# Patient Record
Sex: FEMALE | Race: BLACK OR AFRICAN AMERICAN | NOT HISPANIC OR LATINO | Employment: UNEMPLOYED | URBAN - METROPOLITAN AREA
[De-identification: names, ages, dates, MRNs, and addresses within clinical notes are randomized per-mention and may not be internally consistent; named-entity substitution may affect disease eponyms.]

---

## 2018-07-11 ENCOUNTER — TELEPHONE (OUTPATIENT)
Dept: TRANSPLANT | Facility: CLINIC | Age: 63
End: 2018-07-11

## 2018-07-11 NOTE — TELEPHONE ENCOUNTER
----- Message from Yunior Sullivan sent at 7/11/2018  4:18 PM CDT -----  Have the patient's demographic sheet which includes: patient's name, mailing address, phone number and insurance information. We will check to see if patient medical records are in Care Everywhere:[facility]    By: Yunior Sullivan

## 2018-07-12 ENCOUNTER — DOCUMENTATION ONLY (OUTPATIENT)
Dept: TRANSPLANT | Facility: CLINIC | Age: 63
End: 2018-07-12

## 2018-07-12 ENCOUNTER — TELEPHONE (OUTPATIENT)
Dept: TRANSPLANT | Facility: CLINIC | Age: 63
End: 2018-07-12

## 2018-07-12 NOTE — NURSING
Pt called re the referral we received. LVM to call back. We will need her to obtain all imaging since April 2018 to bring to clinic.

## 2018-07-12 NOTE — TELEPHONE ENCOUNTER
Initial referral received from Dr Lebron Dunham.     Patient with HCV/HCC  MELD 11  Referred for liver transplant for EVALUATION     Referral completed and forwarded to [facility].      Insurance: Medicaid  [employer] [place] [employer]  susbscriber ID [id number]

## 2018-07-16 ENCOUNTER — TELEPHONE (OUTPATIENT)
Dept: TRANSPLANT | Facility: CLINIC | Age: 63
End: 2018-07-16

## 2018-07-16 NOTE — TELEPHONE ENCOUNTER
----- Message from Yunior Sullivan sent at 7/16/2018 11:58 AM CDT -----  Called pt to [facility] appt, but there was no answer. LVM for her to call back to [facility]

## 2018-08-02 ENCOUNTER — TELEPHONE (OUTPATIENT)
Dept: TRANSPLANT | Facility: CLINIC | Age: 63
End: 2018-08-02

## 2018-08-20 ENCOUNTER — TELEPHONE (OUTPATIENT)
Dept: TRANSPLANT | Facility: CLINIC | Age: 63
End: 2018-08-20

## 2018-08-20 NOTE — TELEPHONE ENCOUNTER
----- Message from Yunior Sullivan sent at 8/20/2018 10:41 AM CDT -----  Called and sp to pt to see if she has her new insur cards, but she told me that she is still waiting on the cards. Told her to give us a call as soon as she gets her cards, so we can get her william'ed.    Pt recs have been scanned into MEDIA.

## 2018-11-06 ENCOUNTER — TELEPHONE (OUTPATIENT)
Dept: GASTROENTEROLOGY | Facility: CLINIC | Age: 63
End: 2018-11-06

## 2018-11-07 ENCOUNTER — TELEPHONE (OUTPATIENT)
Dept: GASTROENTEROLOGY | Facility: CLINIC | Age: 63
End: 2018-11-07

## 2018-11-07 NOTE — TELEPHONE ENCOUNTER
----- Message from Tina Prescott sent at 11/7/2018  8:07 AM CST -----  Contact: self   Pt unable to make appointment because her transporation can only bring her on thursday's.she is a previous pt at [facility].requesting to know if an appointment can be any thursday if possible. please call back at [phone].        Thanks,  Tina Prescott

## 2018-12-03 ENCOUNTER — TELEPHONE (OUTPATIENT)
Dept: GASTROENTEROLOGY | Facility: CLINIC | Age: 63
End: 2018-12-03

## 2018-12-03 NOTE — TELEPHONE ENCOUNTER
----- Message from Loyda Salinas sent at 12/3/2018 10:13 AM CST -----  Contact: Patient  Patient called to speak with the nurse; she stated that her insurance is back on. She needs to speak with the nurse about pain medication. She can be contacted at [phone].    Thanks,  Loyda

## 2018-12-05 ENCOUNTER — TELEPHONE (OUTPATIENT)
Dept: GASTROENTEROLOGY | Facility: CLINIC | Age: 63
End: 2018-12-05

## 2018-12-05 NOTE — TELEPHONE ENCOUNTER
----- Message from Mary Luevano sent at 12/5/2018  2:12 PM CST -----  Contact: patient  Calling concerning getting something for pain call in to pharmacy. Please call @ [phone]. Thanks, april    [facility] [address]  [phone]

## 2018-12-05 NOTE — TELEPHONE ENCOUNTER
Spoke with patient she reports that she has been constantly having right upper quadrant pain.  This been present since she has her chemo embolizations.  Of note she does have an appointment with me on Friday.  I explained that we will discuss that appointment on Friday to get updated imaging and try better to manage her pain. She expressed understanding.

## 2018-12-05 NOTE — TELEPHONE ENCOUNTER
Returned patients call and patient stated she having severe abdominal pain. Patient stated she is seen at the [facility] clinic and she is suppose to be getting something for pain. Please advise.

## 2018-12-05 NOTE — TELEPHONE ENCOUNTER
----- Message from Mary Luevano sent at 12/5/2018  2:12 PM CST -----  Contact: patient  Calling concerning getting something for pain call in to pharmacy. Please call @ [phone]. Thanks, april    [facility] [address]  [phone]

## 2018-12-07 ENCOUNTER — OFFICE VISIT (OUTPATIENT)
Dept: GASTROENTEROLOGY | Facility: CLINIC | Age: 63
End: 2018-12-07
Payer: MEDICAID

## 2018-12-07 VITALS
HEART RATE: 80 BPM | DIASTOLIC BLOOD PRESSURE: 82 MMHG | SYSTOLIC BLOOD PRESSURE: 144 MMHG | HEIGHT: 60 IN | WEIGHT: 151.44 LBS | BODY MASS INDEX: 29.73 KG/M2

## 2018-12-07 DIAGNOSIS — B19.20 COMPENSATED HCV CIRRHOSIS: ICD-10-CM

## 2018-12-07 DIAGNOSIS — C85.99 GASTRIC LYMPHOMA: ICD-10-CM

## 2018-12-07 DIAGNOSIS — C22.0 HCC (HEPATOCELLULAR CARCINOMA): Primary | ICD-10-CM

## 2018-12-07 DIAGNOSIS — D49.0 LIVER NEOPLASM: ICD-10-CM

## 2018-12-07 DIAGNOSIS — K59.00 CONSTIPATION, UNSPECIFIED CONSTIPATION TYPE: ICD-10-CM

## 2018-12-07 DIAGNOSIS — K74.69 COMPENSATED HCV CIRRHOSIS: ICD-10-CM

## 2018-12-07 PROCEDURE — 99214 OFFICE O/P EST MOD 30 MIN: CPT | Mod: S$PBB,,, | Performed by: INTERNAL MEDICINE

## 2018-12-07 PROCEDURE — 99999 PR PBB SHADOW E&M-EST. PATIENT-LVL III: CPT | Mod: PBBFAC,,, | Performed by: INTERNAL MEDICINE

## 2018-12-07 PROCEDURE — 99213 OFFICE O/P EST LOW 20 MIN: CPT | Mod: PBBFAC,PO | Performed by: INTERNAL MEDICINE

## 2018-12-07 RX ORDER — GABAPENTIN 300 MG/1
CAPSULE ORAL
COMMUNITY
End: 2018-12-13 | Stop reason: SDUPTHER

## 2018-12-07 RX ORDER — AMLODIPINE BESYLATE 10 MG/1
TABLET ORAL
COMMUNITY

## 2018-12-07 RX ORDER — LACTULOSE 10 G/15ML
20 SOLUTION ORAL
COMMUNITY
Start: 2018-07-17

## 2018-12-07 RX ORDER — TIOTROPIUM BROMIDE 18 UG/1
CAPSULE ORAL; RESPIRATORY (INHALATION)
COMMUNITY

## 2018-12-07 RX ORDER — INSULIN LISPRO 100 [IU]/ML
INJECTION, SUSPENSION SUBCUTANEOUS
COMMUNITY
Start: 2018-03-27

## 2018-12-07 RX ORDER — IBANDRONATE SODIUM 150 MG/1
TABLET, FILM COATED ORAL
COMMUNITY
Start: 2018-01-11

## 2018-12-07 RX ORDER — MULTIVITAMIN
1 TABLET ORAL
COMMUNITY

## 2018-12-07 RX ORDER — FLUTICASONE PROPIONATE AND SALMETEROL 250; 50 UG/1; UG/1
POWDER RESPIRATORY (INHALATION)
COMMUNITY
Start: 2016-08-11 | End: 2019-01-21

## 2018-12-07 NOTE — PROGRESS NOTES
Subjective:     Chani Aviles is here for follow up of cirrhosis    HPI  Since Chani Aviles's last visit she has had varices sure it is issues.  Seems she is now back on Medicaid and can be seen at [facility].  There was some concern that she cannot be seen there.  To to prevent lost to followup she was seen here.  She has had repeat imaging.  She is due for repeat MRI which has already been scheduled.  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  The patient is here with her daughter and the daughter is supportive.  The daughter and patient would like to move forward with liver transplant evaluation.    She has been having right-sided abdominal pain.  Can be intermittent.  Sometimes it spreads from the right lower quadrant to right upper quadrant.  She has had issues with constipation.    Ultrasound of the abdomen on 4/4/2018 showed a 4 cm diameter mass in the right lobe of the liver. She subsequently underwent MRI that showed a 4.7 x 4.4 x 3.5 cm hypervascular mass of the anterior superior segment of the right lobe. It was suspicious for neoplasm. She underwent biopsy on 5/1/2018 that was consistent with a hepatocellular carcinoma.    No evidence of liver decompensation: no ascites, confusion or GI bleeding.    S/p TACE 6/2018 and 8/2018    Last EGD was 6/2018 w/o ulcer, lymphoma or HP    HCV treated with Harvoni.    Review of Systems   Constitutional: Positive for activity change and fatigue.   HENT: Negative.    Eyes: Negative.    Respiratory: Negative.    Cardiovascular: Negative.    Gastrointestinal: Positive for abdominal distention, abdominal pain and constipation.   Genitourinary: Negative.    Musculoskeletal: Negative.    Skin: Negative.    Neurological: Negative.    Psychiatric/Behavioral: Negative.        Objective:     Physical Exam   Constitutional: She  is oriented to person, place, and time. She appears well-developed and well-nourished. No distress.   HENT:   Head: Normocephalic and atraumatic.   Mouth/Throat: Oropharynx is clear and moist. No oropharyngeal exudate.   Eyes: Conjunctivae are normal. Pupils are equal, round, and reactive to light. Right eye exhibits no discharge. Left eye exhibits no discharge. No scleral icterus.   Pulmonary/Chest: Effort normal and breath sounds normal. No respiratory distress. She has no wheezes.   Abdominal: Soft. She exhibits distension (tympanic). There is no tenderness.   Musculoskeletal: She exhibits no edema.   Neurological: She is alert and oriented to person, place, and time.   Psychiatric: She has a normal mood and affect. Her behavior is normal.   Vitals reviewed.      CT 11/2018  Interval embolization of the previously identified hepatic mass.    Increased enhancement around the mass may represent posttreatment changes or recurrent tumor. A small nonspecific hypodense region adjacent to the mass as well as an enhancing capsular nodule may represent tumor or posttreatment changes.    A follow-up MRI is recommended.  Computed MELD-Na score unavailable. Necessary lab results were not found in the last year.  Computed MELD score unavailable. Necessary lab results were not found in the last year.    No results found for: WBC, HGB, HCT, PLT  No results found for: BUN, CREATININE, GLU, CALCIUM, NA, K, CL, MG  No results found for: AST, ALT, ALKPHOS, BILITOT, ALBUMIN  No results found for: INR      Assessment/Plan:     1. HCC (hepatocellular carcinoma)    2. Liver neoplasm    3. Compensated HCV cirrhosis    4. Gastric lymphoma    5. Constipation, unspecified constipation type      Chani Aviles is a 63 y.o. female withHepatic Cancer (liver)    HCC (hepatocellular carcinoma)-cancers within Merrick criteria.  She has been treated and responded well.  Uncertain if this still residual cancer.  She is awaiting repeat MRI scan.   Patient is interested to move forward transplant evaluation.  -will send request for transplant evaluation.  As part of evaluation she will need to see Hematology Oncology regarding lymphoma that resolved with H pylori infection.  -     Comprehensive metabolic panel; Future; Expected date: 12/07/2018  -     AFP tumor marker; Future; Expected date: 12/07/2018  -     MRI Abdomen W WO Contrast; Future; Expected date: 12/07/2018    Liver neoplasm  -     MRI Abdomen W WO Contrast; Future; Expected date: 12/07/2018    Compensated HCV cirrhosis-currently compensated  -     Comprehensive metabolic panel; Future; Expected date: 12/07/2018  -     CBC auto differential; Future; Expected date: 12/07/2018  -     AFP tumor marker; Future; Expected date: 12/07/2018  -     Protime-INR; Future; Expected date: 12/07/2018    Gastric lymphoma-diagnosed on biopsy at EGD but without treatment on subsequent biopsies there was no evidence of lymphoma after treatment of H pylori which is now negative.  Patient was seen by Oncology who had followed her at [facility] and did not think this is prohibitive to liver transplantation.  -will request outside records of gastric biopsies so that patient can be seen by our oncologist as part of her transplant evaluation  -numerous records in Care everywhere     Constipation, unspecified constipation type-abdominal symptoms seem consistent with gas and a constipation. No significant ascites on recent imaging or on physical exam.  -advise she take MiraLax 1 cap daily    Patient was seen in the liver transplant department at [facility].    Return to clinic in 2 months at [facility]      MD ROSE Sanchez Patient Status  Functional Status: 70% - Cares for self: unable to carry on normal activity or active work  Physical Capacity: No Limitations

## 2018-12-07 NOTE — Clinical Note
Patient needs to see onc as part of eval. May be easiest is some can print out the previous EGD, imaging and path for them to review. I am trying to get slides for us to review of the HCC and gastric lymphoma.

## 2018-12-10 ENCOUNTER — TELEPHONE (OUTPATIENT)
Dept: TRANSPLANT | Facility: CLINIC | Age: 63
End: 2018-12-10

## 2018-12-10 ENCOUNTER — TELEPHONE (OUTPATIENT)
Dept: GASTROENTEROLOGY | Facility: CLINIC | Age: 63
End: 2018-12-10

## 2018-12-10 DIAGNOSIS — G62.9 NEUROPATHY: Primary | ICD-10-CM

## 2018-12-10 NOTE — TELEPHONE ENCOUNTER
Spoke with patient. She states  was going to send her a prescription for Gabapentin. Will inform , she verbalized understanding, JUAN.

## 2018-12-10 NOTE — TELEPHONE ENCOUNTER
----- Message from Neyda Hargrove sent at 12/10/2018  1:53 PM CST -----  Contact: pt  Calling in regards to RX medication and please advise [phone]

## 2018-12-10 NOTE — TELEPHONE ENCOUNTER
Referral received from Dr Lorraine Yeboah  Initial  referral from Lebron Dunham.      Patient with HCC/HCV .  MELD 11  Referred for liver transplant for  EVALUATION.    Referral completed and forwarded to [facility].          Insurance: [employer]

## 2018-12-10 NOTE — TELEPHONE ENCOUNTER
----- Message from Lorraine Yeboah MD sent at 12/7/2018 10:23 AM CST -----  Patient needs to see onc as part of eval. May be easiest is some can print out the previous EGD, imaging and path for them to review. I am trying to get slides for us to review of the HCC and gastric lymphoma.

## 2018-12-13 ENCOUNTER — TELEPHONE (OUTPATIENT)
Dept: TRANSPLANT | Facility: CLINIC | Age: 63
End: 2018-12-13

## 2018-12-13 DIAGNOSIS — C85.99 GASTRIC LYMPHOMA: ICD-10-CM

## 2018-12-13 DIAGNOSIS — B19.20 COMPENSATED HCV CIRRHOSIS: ICD-10-CM

## 2018-12-13 DIAGNOSIS — K74.69 COMPENSATED HCV CIRRHOSIS: ICD-10-CM

## 2018-12-13 DIAGNOSIS — Z76.82 LIVER TRANSPLANT CANDIDATE: Primary | ICD-10-CM

## 2018-12-13 DIAGNOSIS — C76.8 MALIGNANT NEOPLASM OF OTHER SPECIFIED ILL-DEFINED SITES: ICD-10-CM

## 2018-12-13 DIAGNOSIS — C22.0 HCC (HEPATOCELLULAR CARCINOMA): ICD-10-CM

## 2018-12-13 RX ORDER — GABAPENTIN 300 MG/1
300 CAPSULE ORAL 3 TIMES DAILY
Qty: 90 CAPSULE | Refills: 5 | Status: SHIPPED | OUTPATIENT
Start: 2018-12-13 | End: 2019-06-25 | Stop reason: SDUPTHER

## 2018-12-14 ENCOUNTER — TELEPHONE (OUTPATIENT)
Dept: TRANSPLANT | Facility: CLINIC | Age: 63
End: 2018-12-14

## 2018-12-14 NOTE — TELEPHONE ENCOUNTER
Called pt to aware her about her authorization release form will be mailed out to her for her signature so we can receive her EDG and biopsy report before her haematology appointment. Pt didn't answer but did leave an detail message for her as well as phone number to call back if there are any questions are concerns

## 2018-12-17 ENCOUNTER — TELEPHONE (OUTPATIENT)
Dept: TRANSPLANT | Facility: CLINIC | Age: 63
End: 2018-12-17

## 2018-12-17 NOTE — TELEPHONE ENCOUNTER
Call pt to discuss standard eval pt explain she is not int rested in the process she believe her liver can heal if she stick to a more comfortable process the evaluation she believe will stress her more having to deal with setting up transportion lodging and passing that message along to her family members

## 2018-12-20 NOTE — TELEPHONE ENCOUNTER
Informed by MA that patient states she is not interested in liver transplant.  Call placed to patient to further discuss.  Patient states transplant will cause too much stress and financial hardship for her and the family.  States she only wants to continue to follow-up with Dr. Yeboah and tumor treatment as indicated.  Advised Dr. Yeboah will be notified.  Understanding expressed.      [place] encounter resolved.  MINNIE Rivera LPN notified.

## 2018-12-27 ENCOUNTER — TELEPHONE (OUTPATIENT)
Dept: HEMATOLOGY/ONCOLOGY | Facility: CLINIC | Age: 63
End: 2018-12-27

## 2018-12-31 DIAGNOSIS — C22.0 HCC (HEPATOCELLULAR CARCINOMA): Primary | ICD-10-CM

## 2018-12-31 PROCEDURE — 88321 TISSUE SPECIMEN TO PATHOLOGY: ICD-10-PCS | Mod: ,,, | Performed by: PATHOLOGY

## 2018-12-31 PROCEDURE — 88321 CONSLTJ&REPRT SLD PREP ELSWR: CPT | Mod: ,,, | Performed by: PATHOLOGY

## 2019-01-04 ENCOUNTER — TELEPHONE (OUTPATIENT)
Dept: TRANSPLANT | Facility: CLINIC | Age: 64
End: 2019-01-04

## 2019-01-04 NOTE — TELEPHONE ENCOUNTER
Spoke with the patient who has not decided not go through transplant evaluation, about continuation of treatment for her HCC.   Patient is scheduled for post treatment MRI with Dr Kwan on 1/131/19.  Patient report the she missed her oncology appointment but will reschedule the appointment.

## 2019-01-09 ENCOUNTER — TELEPHONE (OUTPATIENT)
Dept: HEMATOLOGY/ONCOLOGY | Facility: CLINIC | Age: 64
End: 2019-01-09

## 2019-01-09 NOTE — TELEPHONE ENCOUNTER
Spoke with Stefani (daughter) re poss clinical trial and offered appt. She will discuss with mother and let me know tomorrow.    ========================================  ----- Message from Luis Manuel Green MD sent at 1/9/2019  3:33 PM CST -----  Latricia,    This patient has a newly diagnosed diffuse large B-cell lymphoma. She was supposed to see Dr. Moore in [place] but missed her appointment.    We have a clinical trial in [place] for which she may be eligible. We should reach out to her to get her a new appointment and see if she is willing to come to [place] for therapy.    Thanks,    Luis Manuel

## 2019-01-21 ENCOUNTER — INITIAL CONSULT (OUTPATIENT)
Dept: HEMATOLOGY/ONCOLOGY | Facility: CLINIC | Age: 64
End: 2019-01-21
Payer: MEDICAID

## 2019-01-21 ENCOUNTER — LAB VISIT (OUTPATIENT)
Dept: LAB | Facility: HOSPITAL | Age: 64
End: 2019-01-21
Attending: INTERNAL MEDICINE
Payer: MEDICAID

## 2019-01-21 ENCOUNTER — TELEPHONE (OUTPATIENT)
Dept: GASTROENTEROLOGY | Facility: CLINIC | Age: 64
End: 2019-01-21

## 2019-01-21 VITALS
DIASTOLIC BLOOD PRESSURE: 84 MMHG | BODY MASS INDEX: 30.12 KG/M2 | HEART RATE: 82 BPM | OXYGEN SATURATION: 96 % | HEIGHT: 60 IN | SYSTOLIC BLOOD PRESSURE: 166 MMHG | WEIGHT: 153.44 LBS | TEMPERATURE: 98 F

## 2019-01-21 DIAGNOSIS — C22.0 HCC (HEPATOCELLULAR CARCINOMA): ICD-10-CM

## 2019-01-21 DIAGNOSIS — C85.99 GASTRIC LYMPHOMA: Primary | ICD-10-CM

## 2019-01-21 DIAGNOSIS — K74.69 COMPENSATED HCV CIRRHOSIS: ICD-10-CM

## 2019-01-21 DIAGNOSIS — C85.99 GASTRIC LYMPHOMA: ICD-10-CM

## 2019-01-21 DIAGNOSIS — Z76.82 LIVER TRANSPLANT CANDIDATE: ICD-10-CM

## 2019-01-21 DIAGNOSIS — B19.20 COMPENSATED HCV CIRRHOSIS: ICD-10-CM

## 2019-01-21 LAB
ALBUMIN SERPL BCP-MCNC: 2.3 G/DL
ALP SERPL-CCNC: 268 U/L
ALT SERPL W/O P-5'-P-CCNC: 36 U/L
ANION GAP SERPL CALC-SCNC: 5 MMOL/L
AST SERPL-CCNC: 61 U/L
BASOPHILS # BLD AUTO: 0.04 K/UL
BASOPHILS NFR BLD: 0.9 %
BILIRUB SERPL-MCNC: 1.2 MG/DL
BUN SERPL-MCNC: 21 MG/DL
CALCIUM SERPL-MCNC: 8.8 MG/DL
CHLORIDE SERPL-SCNC: 107 MMOL/L
CO2 SERPL-SCNC: 26 MMOL/L
CREAT SERPL-MCNC: 1.3 MG/DL
DIFFERENTIAL METHOD: ABNORMAL
EOSINOPHIL # BLD AUTO: 0.1 K/UL
EOSINOPHIL NFR BLD: 1.8 %
ERYTHROCYTE [DISTWIDTH] IN BLOOD BY AUTOMATED COUNT: 13.3 %
EST. GFR  (AFRICAN AMERICAN): 50.1 ML/MIN/1.73 M^2
EST. GFR  (NON AFRICAN AMERICAN): 43.5 ML/MIN/1.73 M^2
GLUCOSE SERPL-MCNC: 112 MG/DL
HBV CORE AB SERPL QL IA: POSITIVE
HBV SURFACE AB SER-ACNC: NEGATIVE M[IU]/ML
HBV SURFACE AG SERPL QL IA: NEGATIVE
HCT VFR BLD AUTO: 40.4 %
HCV AB SERPL QL IA: POSITIVE
HGB BLD-MCNC: 13.6 G/DL
HIV 1+2 AB+HIV1 P24 AG SERPL QL IA: NEGATIVE
IMM GRANULOCYTES # BLD AUTO: 0.01 K/UL
IMM GRANULOCYTES NFR BLD AUTO: 0.2 %
LDH SERPL L TO P-CCNC: 201 U/L
LYMPHOCYTES # BLD AUTO: 1.3 K/UL
LYMPHOCYTES NFR BLD: 28.4 %
MCH RBC QN AUTO: 31.7 PG
MCHC RBC AUTO-ENTMCNC: 33.7 G/DL
MCV RBC AUTO: 94 FL
MONOCYTES # BLD AUTO: 0.5 K/UL
MONOCYTES NFR BLD: 11.9 %
NEUTROPHILS # BLD AUTO: 2.5 K/UL
NEUTROPHILS NFR BLD: 56.8 %
NRBC BLD-RTO: 0 /100 WBC
PLATELET # BLD AUTO: 87 K/UL
PMV BLD AUTO: 13 FL
POTASSIUM SERPL-SCNC: 4.1 MMOL/L
PROT SERPL-MCNC: 6.7 G/DL
RBC # BLD AUTO: 4.29 M/UL
SODIUM SERPL-SCNC: 138 MMOL/L
WBC # BLD AUTO: 4.47 K/UL

## 2019-01-21 PROCEDURE — 87522 HEPATITIS C REVRS TRNSCRPJ: CPT

## 2019-01-21 PROCEDURE — 99214 OFFICE O/P EST MOD 30 MIN: CPT | Mod: PBBFAC | Performed by: INTERNAL MEDICINE

## 2019-01-21 PROCEDURE — 99999 PR PBB SHADOW E&M-EST. PATIENT-LVL IV: CPT | Mod: PBBFAC,,, | Performed by: INTERNAL MEDICINE

## 2019-01-21 PROCEDURE — 86703 HIV-1/HIV-2 1 RESULT ANTBDY: CPT

## 2019-01-21 PROCEDURE — 86706 HEP B SURFACE ANTIBODY: CPT

## 2019-01-21 PROCEDURE — 86704 HEP B CORE ANTIBODY TOTAL: CPT

## 2019-01-21 PROCEDURE — 99205 PR OFFICE/OUTPT VISIT, NEW, LEVL V, 60-74 MIN: ICD-10-PCS | Mod: S$PBB,,, | Performed by: INTERNAL MEDICINE

## 2019-01-21 PROCEDURE — 99205 OFFICE O/P NEW HI 60 MIN: CPT | Mod: S$PBB,,, | Performed by: INTERNAL MEDICINE

## 2019-01-21 PROCEDURE — 86803 HEPATITIS C AB TEST: CPT

## 2019-01-21 PROCEDURE — 87340 HEPATITIS B SURFACE AG IA: CPT

## 2019-01-21 PROCEDURE — 36415 COLL VENOUS BLD VENIPUNCTURE: CPT

## 2019-01-21 PROCEDURE — 80053 COMPREHEN METABOLIC PANEL: CPT

## 2019-01-21 PROCEDURE — 85025 COMPLETE CBC W/AUTO DIFF WBC: CPT

## 2019-01-21 PROCEDURE — 99999 PR PBB SHADOW E&M-EST. PATIENT-LVL IV: ICD-10-PCS | Mod: PBBFAC,,, | Performed by: INTERNAL MEDICINE

## 2019-01-21 PROCEDURE — 83615 LACTATE (LD) (LDH) ENZYME: CPT

## 2019-01-21 RX ORDER — LOSARTAN POTASSIUM 100 MG/1
100 TABLET ORAL DAILY
Refills: 3 | COMMUNITY
Start: 2019-01-10 | End: 2019-03-19

## 2019-01-21 RX ORDER — SPIRONOLACTONE 25 MG/1
25 TABLET ORAL 2 TIMES DAILY
Refills: 3 | COMMUNITY
Start: 2019-01-10

## 2019-01-21 RX ORDER — FUROSEMIDE 20 MG/1
20 TABLET ORAL DAILY
Refills: 3 | COMMUNITY
Start: 2019-01-10

## 2019-01-21 RX ORDER — HYDROCODONE BITARTRATE AND ACETAMINOPHEN 5; 325 MG/1; MG/1
1 TABLET ORAL EVERY 4 HOURS PRN
Refills: 0 | COMMUNITY
Start: 2019-01-06

## 2019-01-21 NOTE — Clinical Note
Patient needs labs today.Please schedule CT abdomen/pelvis and PET/CT in [place] as soon as possible.Referral to GI in [place] placed. Please help schedule as soon as possible.

## 2019-01-21 NOTE — TELEPHONE ENCOUNTER
Called and spoke to a physician in pathology at [facility].  She asked for the phone number and  at [facility].  She will call them and arrange to have slides sent back.

## 2019-01-21 NOTE — TELEPHONE ENCOUNTER
----- Message from Nancy Gates sent at 1/21/2019 12:51 PM CST -----  Contact: Latrell/ [facility] -[phone]  States that she is calling to speak to nurse regarding getting slides back. Please call back at [phone]//thank you acc

## 2019-01-21 NOTE — ASSESSMENT & PLAN NOTE
Ms. Aviles appears to have had a gastric diffuse large B-cell lymphoma that attained remission after therapy with H. Pylori.     Although MALT lymphomas are more classically H. Pylori associated, gastric DLBCL has been described in association with H. Pylori. In a small, phase 2 trial of H. Pylori eradication as the exclusive management of DLBCL, 16 patients were treated with H. Pylori eradication only, and 7 complete remissions were obtained that appeared durable with a median of 68 months of follow-up (Ferreri et al. Blood. 2012;120:8127-6686). Additional work has also been published to suggest that H. Pylori-associated DLBCL has a higher cure rate and lower aggressiveness than more classical DLBCL (Dewayne et al. Blood Cancer J. 2014;4:e220). Thus, it is possible that Ms. Aviles is in CR after her treatment for H. Pylori. She is clinically well at this time, and active disease would likely have a short interval until symptom onset given the high proliferative index (Ki-67 of 90%) of her underlying lymphoma.    Therefore, I suspect she is in a complete remission; however, we need to confirm this. I have recommended the following tests:  - Repeat EGD with random biopsies (if no abnormalities seen)  - PET/CT  - CT abdomen/pelvis with triple phase IV contrast (to help delineate HCC)    If she remains in complete remission more than one and a half years since her diagnosis, then I suspect she will have ongoing remission. Liver transplantation (and subsequent immunosuppression) may elevate the risk of relapse; however, it would remain a treatable, and potentially curable condition. I will finalize my risk assessment for transplant once the above studies are complete.

## 2019-01-21 NOTE — PROGRESS NOTES
HEMATOLOGIC MALIGNANCIES CONSULT NOTE    IDENTIFYING STATEMENT   Chani Henriquez) is a 63 y.o. female with a  of 1955 from [place], referred by Dr. Yeboah for evaluation of high grade B-cell lymphoma.     HISTORY OF PRESENT ILLNESS:      Ms. 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 She is accompanied by her daughter today.     She had EGD in 3/2017 as part of a screening for esophageal varices, given her known cirrhosis. An ulcer was found and biopsied. This returned as consistent with a high-grade B-cell lymphoma. She was also H. Pylori positive and treated for this. She was referred to Dr. Martínez locally who ordered staging scans, but the patient did not present initially, ostensibly due to insurance issues.     She ultimately followed-up in 2017, at which time a repeat EGD was requested. This showed no evidence of ulceration. A PET/CT obtained at that time was also negative for malignancy. Therapy was not recommended.     She followed up with Dr. Martínez again in 2018. She was being managed for hepatocellular carcinoma at that time. Again, it was considered that she did not have any active lymphoma.    She is being evaluated by Dr. Yeboah for a possible liver transplant. They have requested evaluation as part of a liver transplant eval.     Past Medical History:   Diagnosis Date    Compensated HCV cirrhosis 2018    Constipation 2018    Gastric lymphoma 2018    HCC (hepatocellular carcinoma) 2018       History reviewed. No pertinent family history.    Social History     Socioeconomic History    Marital status:      Spouse name: Not on file    Number of children: Not on file    Years of education: Not on file    Highest education level: Not on file   Social Needs    Financial resource strain:  Not on file    Food insecurity - worry: Not on file    Food insecurity - inability: Not on file    Transportation needs - medical: Not on file    Transportation needs - non-medical: Not on file   Occupational History    Not on file   Tobacco Use    Smoking status: Light Tobacco Smoker     Types: Cigarettes    Smokeless tobacco: Never Used   Substance and Sexual Activity    Alcohol use: No     Frequency: Never     Binge frequency: Never    Drug use: No    Sexual activity: No   Other Topics Concern    Not on file   Social History Narrative    Not on file         MEDICATIONS:     Current Outpatient Medications on File Prior to Visit   Medication Sig Dispense Refill    amLODIPine (NORVASC) 10 MG tablet amlodipine 10 mg tablet      furosemide (LASIX) 20 MG tablet Take 20 mg by mouth once daily.  3    gabapentin (NEURONTIN) 300 MG capsule Take 1 capsule (300 mg total) by mouth 3 (three) times daily. 90 capsule 5    HYDROcodone-acetaminophen (NORCO) 5-325 mg per tablet Take 1 tablet by mouth every 4 (four) hours as needed. for pain.  0    ibandronate (BONIVA) 150 mg tablet ibandronate 150 mg tablet      insulin lispro protamin-lispro (HUMALOG MIX 75-25 KWIKPEN) 100 unit/mL (75-25) InPn 25 UNITS SUBCUTANEOUSLY IN AM AND 15 UNITS AT NIGHT      iron bis-gly-FA-O-D15-Gi-succ 150 mg iron(21) -400 mcg (7) Tab Take 1 tablet by mouth.      lactulose (CHRONULAC) 20 gram/30 mL Soln Take 20 g by mouth.      losartan (COZAAR) 100 MG tablet Take 100 mg by mouth once daily.  3    multivitamin (ONE DAILY MULTIVITAMIN) per tablet Take 1 tablet by mouth.      spironolactone (ALDACTONE) 25 MG tablet Take 25 mg by mouth 2 (two) times daily.  3    tiotropium (SPIRIVA WITH HANDIHALER) 18 mcg inhalation capsule Spiriva with HandiHaler 18 mcg and inhalation capsules      tiotropium bromide (SPIRIVA RESPIMAT) 2.5 mcg/actuation Mist Spiriva Respimat 2.5 mcg/actuation solution for inhalation       No current  facility-administered medications on file prior to visit.        ALLERGIES: Review of patient's allergies indicates:  No Known Allergies     ROS:       Review of Systems   Constitutional: Positive for fatigue. Negative for diaphoresis, fever and unexpected weight change.   HENT:   Negative for lump/mass and sore throat.    Eyes: Negative for icterus.   Respiratory: Negative for cough and shortness of breath.    Cardiovascular: Negative for chest pain and palpitations.   Gastrointestinal: Positive for abdominal distention and abdominal pain (to right upper quadrant). Negative for constipation, diarrhea, nausea and vomiting.   Genitourinary: Negative for dysuria and frequency.    Musculoskeletal: Negative for arthralgias, gait problem and myalgias.   Skin: Negative for rash.   Neurological: Negative for dizziness, gait problem and headaches.   Hematological: Negative for adenopathy. Does not bruise/bleed easily.   Psychiatric/Behavioral: The patient is not nervous/anxious.        PHYSICAL EXAM:  Vitals:    01/21/19 0855   BP: (!) 166/84   Pulse: 82   Temp: 97.9 °F (36.6 °C)   SpO2: 96%   Weight: 69.6 kg (153 lb 7 oz)   Height: 5' (1.524 m)   PainSc:   4   PainLoc: Generalized       Physical Exam   Constitutional: She is oriented to person, place, and time. She appears well-developed and well-nourished. No distress.   HENT:   Head: Normocephalic and atraumatic.   Mouth/Throat: Mucous membranes are normal. No oral lesions.   Eyes: Conjunctivae are normal.   Neck: No thyromegaly present.   Cardiovascular: Normal rate, regular rhythm and normal heart sounds.   No murmur heard.  Pulmonary/Chest: Breath sounds normal. She has no wheezes. She has no rales.   Abdominal: Soft. She exhibits distension. She exhibits no mass. There is no splenomegaly or hepatomegaly. There is tenderness (to right upper quadrant with palpation).   Lymphadenopathy:     She has no cervical adenopathy.        Right cervical: No deep cervical  adenopathy present.       Left cervical: No deep cervical adenopathy present.     She has no axillary adenopathy.        Right: No inguinal adenopathy present.        Left: No inguinal adenopathy present.   Neurological: She is alert and oriented to person, place, and time. She has normal strength and normal reflexes. No cranial nerve deficit. Coordination normal.   Skin: No rash noted.       LAB: No results found for this or any previous visit.    PROBLEMS ASSESSED THIS VISIT:    1. Gastric lymphoma    2. HCC (hepatocellular carcinoma)    3. Liver transplant candidate    4. Compensated HCV cirrhosis        IMPRESSION:    1. 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 3/13/2017: EGD as part of cirrhosis evaluation - gastric ulcers seen and biopsied - high-grade B-cell lymphoma with MYC translocation (but no BCL-2 or BCL-6 translocation).   B. Subsequent treatment of H. Pylori   C. 9/2017: Repeat EGD showed no ulceration   D. 10/16/2017: PET/CT shows no evidence of malignant disease   E. 6/4/2018: EGD with biopsies shows no evidence of malignancy    2. Locally advanced hepatocellular carcinoma   A. 4/4/2018: U/S abdomen showed 4 cm diameter mass in right lobe of the liver   B. Subsequent MRI showed 4.7 x 4.4 x 3.5 cm hypervascular mass of the anterior superior segment of the right lobe, suspicious for neoplasm   C. 5/1/2018: Biopsy is consistent with hepatocellular carcinoma   D. 6/12/2018: TACE    3. Hepatitis C s/p treatment with ledipasvir and sofosbuvir  4. Compensated cirrhosis of the liver secondary to #3 above    PLAN:       Gastric lymphoma  Ms. Aviles appears to have had a gastric diffuse large B-cell lymphoma that attained remission after therapy with H. Pylori.     Although MALT lymphomas are more classically H. Pylori associated, gastric DLBCL has been described in association with H. Pylori. In a small, phase 2 trial of H. Pylori eradication as the exclusive management of  DLBCL, 16 patients were treated with H. Pylori eradication only, and 7 complete remissions were obtained that appeared durable with a median of 68 months of follow-up (Ferreri et al. Blood. 2012;120:7393-3878). Additional work has also been published to suggest that H. Pylori-associated DLBCL has a higher cure rate and lower aggressiveness than more classical DLBCL (Dewayne et al. Blood Cancer J. 2014;4:e220). Thus, it is possible that Ms. Aviles is in CR after her treatment for H. Pylori. She is clinically well at this time, and active disease would likely have a short interval until symptom onset given the high proliferative index (Ki-67 of 90%) of her underlying lymphoma.    Therefore, I suspect she is in a complete remission; however, we need to confirm this. I have recommended the following tests:  - Repeat EGD with random biopsies (if no abnormalities seen)  - PET/CT  - CT abdomen/pelvis with triple phase IV contrast (to help delineate HCC)    If she remains in complete remission more than one and a half years since her diagnosis, then I suspect she will have ongoing remission. Liver transplantation (and subsequent immunosuppression) may elevate the risk of relapse; however, it would remain a treatable, and potentially curable condition. I will finalize my risk assessment for transplant once the above studies are complete.     HCC (hepatocellular carcinoma)  Receiving therapy locally. We will be assessing this with upcoming CT. She is under consideration for liver transplant.     Compensated HCV cirrhosis  No evidence of decompensated disease at this time. Continue furosemide and spironolactone.     Follow-up to be determined after above studies.     Luis Manuel Green MD  Hematology and Stem Cell Transplant

## 2019-01-22 LAB
HCV RNA SERPL NAA+PROBE-LOG IU: 4.86 LOG (10) IU/ML
HCV RNA SERPL QL NAA+PROBE: DETECTED IU/ML
HCV RNA SPEC NAA+PROBE-ACNC: ABNORMAL IU/ML

## 2019-01-22 NOTE — ASSESSMENT & PLAN NOTE
Receiving therapy locally. We will be assessing this with upcoming CT. She is under consideration for liver transplant.

## 2019-02-18 ENCOUNTER — HOSPITAL ENCOUNTER (OUTPATIENT)
Facility: HOSPITAL | Age: 64
Discharge: HOME | End: 2019-02-20
Attending: EMERGENCY MEDICINE | Admitting: HOSPITALIST
Payer: MEDICAID

## 2019-02-18 DIAGNOSIS — K74.69 COMPENSATED HCV CIRRHOSIS: ICD-10-CM

## 2019-02-18 DIAGNOSIS — C85.99 GASTRIC LYMPHOMA: ICD-10-CM

## 2019-02-18 DIAGNOSIS — C22.0 HCC (HEPATOCELLULAR CARCINOMA): ICD-10-CM

## 2019-02-18 DIAGNOSIS — K74.60 CIRRHOSIS OF LIVER WITHOUT ASCITES, UNSPECIFIED HEPATIC CIRRHOSIS TYPE: ICD-10-CM

## 2019-02-18 DIAGNOSIS — B19.20 COMPENSATED HCV CIRRHOSIS: ICD-10-CM

## 2019-02-18 DIAGNOSIS — D62 ANEMIA DUE TO BLOOD LOSS, ACUTE: ICD-10-CM

## 2019-02-18 DIAGNOSIS — K92.1 MELENA: Primary | ICD-10-CM

## 2019-02-18 DIAGNOSIS — K92.1 GASTROINTESTINAL HEMORRHAGE WITH MELENA: ICD-10-CM

## 2019-02-18 LAB
ABO + RH BLD: NORMAL
ALBUMIN SERPL BCP-MCNC: 2.3 G/DL
ALP SERPL-CCNC: 312 U/L
ALT SERPL W/O P-5'-P-CCNC: 40 U/L
ANION GAP SERPL CALC-SCNC: 7 MMOL/L
APTT BLDCRRT: 26.9 SEC
AST SERPL-CCNC: 82 U/L
BACTERIA #/AREA URNS HPF: ABNORMAL /HPF
BASOPHILS # BLD AUTO: 0.03 K/UL
BASOPHILS NFR BLD: 0.5 %
BILIRUB SERPL-MCNC: 0.5 MG/DL
BILIRUB UR QL STRIP: NEGATIVE
BLD GP AB SCN CELLS X3 SERPL QL: NORMAL
BUN SERPL-MCNC: 29 MG/DL
CALCIUM SERPL-MCNC: 8.7 MG/DL
CHLORIDE SERPL-SCNC: 108 MMOL/L
CLARITY UR: CLEAR
CO2 SERPL-SCNC: 27 MMOL/L
COLOR UR: YELLOW
CREAT SERPL-MCNC: 1.4 MG/DL
DIFFERENTIAL METHOD: ABNORMAL
EOSINOPHIL # BLD AUTO: 0.1 K/UL
EOSINOPHIL NFR BLD: 1.8 %
ERYTHROCYTE [DISTWIDTH] IN BLOOD BY AUTOMATED COUNT: 14.1 %
EST. GFR  (AFRICAN AMERICAN): 46 ML/MIN/1.73 M^2
EST. GFR  (NON AFRICAN AMERICAN): 40 ML/MIN/1.73 M^2
ETHANOL SERPL-MCNC: <10 MG/DL
GLUCOSE SERPL-MCNC: 123 MG/DL
GLUCOSE UR QL STRIP: NEGATIVE
HCT VFR BLD AUTO: 34.8 %
HGB BLD-MCNC: 11.8 G/DL
HGB UR QL STRIP: ABNORMAL
HYALINE CASTS #/AREA URNS LPF: 0 /LPF
INR PPP: 1
KETONES UR QL STRIP: NEGATIVE
LEUKOCYTE ESTERASE UR QL STRIP: NEGATIVE
LYMPHOCYTES # BLD AUTO: 1.5 K/UL
LYMPHOCYTES NFR BLD: 27 %
MAGNESIUM SERPL-MCNC: 2.2 MG/DL
MCH RBC QN AUTO: 32.2 PG
MCHC RBC AUTO-ENTMCNC: 33.9 G/DL
MCV RBC AUTO: 95 FL
MICROSCOPIC COMMENT: ABNORMAL
MONOCYTES # BLD AUTO: 0.5 K/UL
MONOCYTES NFR BLD: 8.9 %
NEUTROPHILS # BLD AUTO: 3.5 K/UL
NEUTROPHILS NFR BLD: 61.8 %
NITRITE UR QL STRIP: NEGATIVE
PH UR STRIP: 6 [PH] (ref 5–8)
PHOSPHATE SERPL-MCNC: 3.1 MG/DL
PLATELET # BLD AUTO: 98 K/UL
PMV BLD AUTO: 12.6 FL
POCT GLUCOSE: 109 MG/DL (ref 70–110)
POTASSIUM SERPL-SCNC: 4.6 MMOL/L
PROT SERPL-MCNC: 6.6 G/DL
PROT UR QL STRIP: ABNORMAL
PROTHROMBIN TIME: 10.9 SEC
RBC # BLD AUTO: 3.66 M/UL
RBC #/AREA URNS HPF: 2 /HPF (ref 0–4)
SODIUM SERPL-SCNC: 142 MMOL/L
SP GR UR STRIP: 1.02 (ref 1–1.03)
SQUAMOUS #/AREA URNS HPF: 2 /HPF
URN SPEC COLLECT METH UR: ABNORMAL
UROBILINOGEN UR STRIP-ACNC: NEGATIVE EU/DL
WBC # BLD AUTO: 5.59 K/UL
WBC #/AREA URNS HPF: 4 /HPF (ref 0–5)
YEAST URNS QL MICRO: ABNORMAL

## 2019-02-18 PROCEDURE — G0378 HOSPITAL OBSERVATION PER HR: HCPCS

## 2019-02-18 PROCEDURE — C9113 INJ PANTOPRAZOLE SODIUM, VIA: HCPCS | Performed by: EMERGENCY MEDICINE

## 2019-02-18 PROCEDURE — 85610 PROTHROMBIN TIME: CPT

## 2019-02-18 PROCEDURE — 96361 HYDRATE IV INFUSION ADD-ON: CPT | Performed by: EMERGENCY MEDICINE

## 2019-02-18 PROCEDURE — 63600175 PHARM REV CODE 636 W HCPCS: Performed by: EMERGENCY MEDICINE

## 2019-02-18 PROCEDURE — 85025 COMPLETE CBC W/AUTO DIFF WBC: CPT

## 2019-02-18 PROCEDURE — 25000003 PHARM REV CODE 250: Performed by: NURSE PRACTITIONER

## 2019-02-18 PROCEDURE — 80320 DRUG SCREEN QUANTALCOHOLS: CPT

## 2019-02-18 PROCEDURE — 85730 THROMBOPLASTIN TIME PARTIAL: CPT

## 2019-02-18 PROCEDURE — 84100 ASSAY OF PHOSPHORUS: CPT

## 2019-02-18 PROCEDURE — 86850 RBC ANTIBODY SCREEN: CPT

## 2019-02-18 PROCEDURE — 36415 COLL VENOUS BLD VENIPUNCTURE: CPT

## 2019-02-18 PROCEDURE — 96376 TX/PRO/DX INJ SAME DRUG ADON: CPT | Performed by: EMERGENCY MEDICINE

## 2019-02-18 PROCEDURE — 63600175 PHARM REV CODE 636 W HCPCS: Performed by: NURSE PRACTITIONER

## 2019-02-18 PROCEDURE — 99285 EMERGENCY DEPT VISIT HI MDM: CPT

## 2019-02-18 PROCEDURE — 80053 COMPREHEN METABOLIC PANEL: CPT

## 2019-02-18 PROCEDURE — 83735 ASSAY OF MAGNESIUM: CPT

## 2019-02-18 PROCEDURE — C9113 INJ PANTOPRAZOLE SODIUM, VIA: HCPCS | Performed by: NURSE PRACTITIONER

## 2019-02-18 PROCEDURE — 25000003 PHARM REV CODE 250: Performed by: EMERGENCY MEDICINE

## 2019-02-18 PROCEDURE — 96374 THER/PROPH/DIAG INJ IV PUSH: CPT | Performed by: EMERGENCY MEDICINE

## 2019-02-18 PROCEDURE — 81000 URINALYSIS NONAUTO W/SCOPE: CPT

## 2019-02-18 RX ORDER — SODIUM CHLORIDE 0.9 % (FLUSH) 0.9 %
5 SYRINGE (ML) INJECTION
Status: DISCONTINUED | OUTPATIENT
Start: 2019-02-18 | End: 2019-02-20 | Stop reason: HOSPADM

## 2019-02-18 RX ORDER — PANTOPRAZOLE SODIUM 40 MG/10ML
40 INJECTION, POWDER, LYOPHILIZED, FOR SOLUTION INTRAVENOUS 2 TIMES DAILY
Status: DISCONTINUED | OUTPATIENT
Start: 2019-02-18 | End: 2019-02-20

## 2019-02-18 RX ORDER — SPIRONOLACTONE 25 MG/1
25 TABLET ORAL DAILY
Status: DISCONTINUED | OUTPATIENT
Start: 2019-02-19 | End: 2019-02-20 | Stop reason: HOSPADM

## 2019-02-18 RX ORDER — GABAPENTIN 100 MG/1
100 CAPSULE ORAL 3 TIMES DAILY
Status: DISCONTINUED | OUTPATIENT
Start: 2019-02-18 | End: 2019-02-20 | Stop reason: HOSPADM

## 2019-02-18 RX ORDER — SODIUM CHLORIDE 9 MG/ML
INJECTION, SOLUTION INTRAVENOUS CONTINUOUS
Status: DISCONTINUED | OUTPATIENT
Start: 2019-02-18 | End: 2019-02-18

## 2019-02-18 RX ORDER — LISINOPRIL 10 MG/1
10 TABLET ORAL EVERY MORNING
Status: ON HOLD | COMMUNITY
End: 2019-02-20

## 2019-02-18 RX ORDER — IPRATROPIUM BROMIDE 0.5 MG/2.5ML
0.5 SOLUTION RESPIRATORY (INHALATION) EVERY 12 HOURS
Status: DISCONTINUED | OUTPATIENT
Start: 2019-02-19 | End: 2019-02-20 | Stop reason: HOSPADM

## 2019-02-18 RX ORDER — LOSARTAN POTASSIUM 50 MG/1
100 TABLET ORAL DAILY
Status: DISCONTINUED | OUTPATIENT
Start: 2019-02-19 | End: 2019-02-18

## 2019-02-18 RX ORDER — LISINOPRIL 10 MG/1
10 TABLET ORAL DAILY
Status: DISCONTINUED | OUTPATIENT
Start: 2019-02-18 | End: 2019-02-19

## 2019-02-18 RX ORDER — CLONIDINE HYDROCHLORIDE 0.1 MG/1
0.1 TABLET ORAL EVERY 6 HOURS PRN
Status: DISCONTINUED | OUTPATIENT
Start: 2019-02-18 | End: 2019-02-20 | Stop reason: HOSPADM

## 2019-02-18 RX ORDER — PANTOPRAZOLE SODIUM 40 MG/10ML
40 INJECTION, POWDER, LYOPHILIZED, FOR SOLUTION INTRAVENOUS DAILY
Status: DISCONTINUED | OUTPATIENT
Start: 2019-02-18 | End: 2019-02-18 | Stop reason: SDUPTHER

## 2019-02-18 RX ORDER — AMLODIPINE BESYLATE 10 MG/1
10 TABLET ORAL NIGHTLY
Status: DISCONTINUED | OUTPATIENT
Start: 2019-02-18 | End: 2019-02-19

## 2019-02-18 RX ORDER — IPRATROPIUM BROMIDE AND ALBUTEROL SULFATE 2.5; .5 MG/3ML; MG/3ML
3 SOLUTION RESPIRATORY (INHALATION) EVERY 6 HOURS PRN
Status: DISCONTINUED | OUTPATIENT
Start: 2019-02-18 | End: 2019-02-20 | Stop reason: HOSPADM

## 2019-02-18 RX ORDER — AMLODIPINE BESYLATE 10 MG/1
10 TABLET ORAL NIGHTLY
Status: ON HOLD | COMMUNITY
End: 2019-02-19 | Stop reason: SDUPTHER

## 2019-02-18 RX ORDER — GLUCAGON 1 MG
1 KIT INJECTION
Status: DISCONTINUED | OUTPATIENT
Start: 2019-02-18 | End: 2019-02-20 | Stop reason: HOSPADM

## 2019-02-18 RX ORDER — INSULIN ASPART 100 [IU]/ML
0-5 INJECTION, SOLUTION INTRAVENOUS; SUBCUTANEOUS EVERY 6 HOURS PRN
Status: DISCONTINUED | OUTPATIENT
Start: 2019-02-18 | End: 2019-02-20 | Stop reason: HOSPADM

## 2019-02-18 RX ORDER — GLUCAGON 1 MG
1 KIT INJECTION
Status: DISCONTINUED | OUTPATIENT
Start: 2019-02-18 | End: 2019-02-18

## 2019-02-18 RX ADMIN — PANTOPRAZOLE SODIUM 40 MG: 40 INJECTION, POWDER, FOR SOLUTION INTRAVENOUS at 09:02

## 2019-02-18 RX ADMIN — AMLODIPINE BESYLATE 10 MG: 10 TABLET ORAL at 09:02

## 2019-02-18 RX ADMIN — GABAPENTIN 100 MG: 100 CAPSULE ORAL at 09:02

## 2019-02-18 RX ADMIN — PANTOPRAZOLE SODIUM 40 MG: 40 INJECTION, POWDER, LYOPHILIZED, FOR SOLUTION INTRAVENOUS at 11:02

## 2019-02-18 RX ADMIN — LISINOPRIL 10 MG: 10 TABLET ORAL at 11:02

## 2019-02-18 RX ADMIN — SODIUM CHLORIDE: 0.9 INJECTION, SOLUTION INTRAVENOUS at 09:02

## 2019-02-18 NOTE — ED PROVIDER NOTES
SCRIBE #1 NOTE: I, Evelyn Rea, am scribing for, and in the presence of, Kip Gonzales MD. I have scribed the entire note.       History     Chief Complaint   Patient presents with    Rectal Bleeding     dark red blood x4 days with; pain to RLQ and rectum     Review of patient's allergies indicates:  No Known Allergies      History of Present Illness     HPI    2/18/2019, 5:00 PM  History obtained from the patient      History of Present Illness: Chani Aviles is a 63 y.o. female patient who presents to the Emergency Department for evaluation of melena which onset gradually 4 days ago. Symptoms are episodic and moderate in severity. No mitigating or exacerbating factors reported. Associated sxs include rectal pain and diffuse abd pain. Patient denies any fever, chills, constipation, n/v/d, dysuria, hematuria, fatigue, dizziness, and all other sxs at this time. No further complaints or concerns at this time.       Arrival mode: Personal vehicle      PCP: Primary Doctor No        Past Medical History:  Past Medical History:   Diagnosis Date    Compensated HCV cirrhosis 12/7/2018    Constipation 12/7/2018    Gastric lymphoma 12/7/2018    HCC (hepatocellular carcinoma) 12/7/2018       Past Surgical History:  History reviewed. No pertinent surgical history.      Family History:  History reviewed. No pertinent family history.    Social History:  Social History     Tobacco Use    Smoking status: Light Tobacco Smoker     Types: Cigarettes    Smokeless tobacco: Never Used   Substance and Sexual Activity    Alcohol use: No     Frequency: Never     Binge frequency: Never    Drug use: No    Sexual activity: No        Review of Systems     Review of Systems   Constitutional: Negative for chills, fatigue and fever.   HENT: Negative for congestion, rhinorrhea and sore throat.    Respiratory: Negative for cough and shortness of breath.    Cardiovascular: Negative for chest pain.   Gastrointestinal: Positive  "for abdominal pain (Diffuse), blood in stool (Melena) and rectal pain. Negative for diarrhea, nausea and vomiting.   Genitourinary: Negative for dysuria, frequency and hematuria.   Musculoskeletal: Negative for back pain and neck pain.   Skin: Negative for rash.   Neurological: Negative for dizziness, weakness, numbness and headaches.   Hematological: Does not bruise/bleed easily.   All other systems reviewed and are negative.       Physical Exam     Initial Vitals [02/18/19 1611]   BP Pulse Resp Temp SpO2   (!) 143/81 83 16 98 °F (36.7 °C) 97 %      MAP       --          Physical Exam  Nursing Notes and Vital Signs Reviewed.  Constitutional: Patient is in no acute distress. Well-developed and well-nourished.  Head: Atraumatic. Normocephalic.  Eyes: PERRL. EOM intact. Conjunctivae are not pale. No scleral icterus.  ENT: Mucous membranes are moist. Oropharynx is clear and symmetric.    Neck: Supple. Full ROM. No lymphadenopathy.  Cardiovascular: Regular rate. Regular rhythm. No murmurs, rubs, or gallops. Distal pulses are 2+ and symmetric.  Pulmonary/Chest: No respiratory distress. Clear to auscultation bilaterally. No wheezing or rales.  Abdominal: Soft and non-distended.  There is no tenderness.  No rebound, guarding, or rigidity.   Musculoskeletal: Moves all extremities. No obvious deformities. No edema.  Skin: Warm and dry.  Neurological:  Alert, awake, and appropriate.  Normal speech.  No acute focal neurological deficits are appreciated.  Psychiatric: Normal affect. Good eye contact. Appropriate in content.     ED Course   Procedures  ED Vital Signs:  Vitals:    02/18/19 1611 02/18/19 1745 02/18/19 1826   BP: (!) 143/81 (!) 167/82 (!) 169/81   Pulse: 83 80 82   Resp: 16 18 16   Temp: 98 °F (36.7 °C)     TempSrc: Oral     SpO2: 97% 100% 99%   Weight: 69 kg (152 lb 1.9 oz)     Height: 5' 6" (1.676 m)         Abnormal Lab Results:  Labs Reviewed   CBC W/ AUTO DIFFERENTIAL - Abnormal; Notable for the following " components:       Result Value    RBC 3.66 (*)     Hemoglobin 11.8 (*)     Hematocrit 34.8 (*)     MCH 32.2 (*)     Platelets 98 (*)     All other components within normal limits   COMPREHENSIVE METABOLIC PANEL - Abnormal; Notable for the following components:    Glucose 123 (*)     BUN, Bld 29 (*)     Albumin 2.3 (*)     Alkaline Phosphatase 312 (*)     AST 82 (*)     Anion Gap 7 (*)     eGFR if  46 (*)     eGFR if non  40 (*)     All other components within normal limits   URINALYSIS, REFLEX TO URINE CULTURE - Abnormal; Notable for the following components:    Protein, UA 2+ (*)     Occult Blood UA Trace (*)     All other components within normal limits    Narrative:     Preferred Collection Type->Urine, Clean Catch   URINALYSIS MICROSCOPIC - Abnormal; Notable for the following components:    Yeast, UA Rare (*)     All other components within normal limits    Narrative:     Preferred Collection Type->Urine, Clean Catch   PROTIME-INR   APTT   TYPE & SCREEN        All Lab Results:  Results for orders placed or performed during the hospital encounter of 02/18/19   CBC auto differential   Result Value Ref Range    WBC 5.59 3.90 - 12.70 K/uL    RBC 3.66 (L) 4.00 - 5.40 M/uL    Hemoglobin 11.8 (L) 12.0 - 16.0 g/dL    Hematocrit 34.8 (L) 37.0 - 48.5 %    MCV 95 82 - 98 fL    MCH 32.2 (H) 27.0 - 31.0 pg    MCHC 33.9 32.0 - 36.0 g/dL    RDW 14.1 11.5 - 14.5 %    Platelets 98 (L) 150 - 350 K/uL    MPV 12.6 9.2 - 12.9 fL    Gran # (ANC) 3.5 1.8 - 7.7 K/uL    Lymph # 1.5 1.0 - 4.8 K/uL    Mono # 0.5 0.3 - 1.0 K/uL    Eos # 0.1 0.0 - 0.5 K/uL    Baso # 0.03 0.00 - 0.20 K/uL    Gran% 61.8 38.0 - 73.0 %    Lymph% 27.0 18.0 - 48.0 %    Mono% 8.9 4.0 - 15.0 %    Eosinophil% 1.8 0.0 - 8.0 %    Basophil% 0.5 0.0 - 1.9 %    Differential Method Automated    Comprehensive metabolic panel   Result Value Ref Range    Sodium 142 136 - 145 mmol/L    Potassium 4.6 3.5 - 5.1 mmol/L    Chloride 108 95 - 110  mmol/L    CO2 27 23 - 29 mmol/L    Glucose 123 (H) 70 - 110 mg/dL    BUN, Bld 29 (H) 8 - 23 mg/dL    Creatinine 1.4 0.5 - 1.4 mg/dL    Calcium 8.7 8.7 - 10.5 mg/dL    Total Protein 6.6 6.0 - 8.4 g/dL    Albumin 2.3 (L) 3.5 - 5.2 g/dL    Total Bilirubin 0.5 0.1 - 1.0 mg/dL    Alkaline Phosphatase 312 (H) 55 - 135 U/L    AST 82 (H) 10 - 40 U/L    ALT 40 10 - 44 U/L    Anion Gap 7 (L) 8 - 16 mmol/L    eGFR if African American 46 (A) >60 mL/min/1.73 m^2    eGFR if non African American 40 (A) >60 mL/min/1.73 m^2   Urinalysis, Reflex to Urine Culture Urine, Clean Catch   Result Value Ref Range    Specimen UA Urine, Clean Catch     Color, UA Yellow Yellow, Straw, Gely    Appearance, UA Clear Clear    pH, UA 6.0 5.0 - 8.0    Specific Gravity, UA 1.025 1.005 - 1.030    Protein, UA 2+ (A) Negative    Glucose, UA Negative Negative    Ketones, UA Negative Negative    Bilirubin (UA) Negative Negative    Occult Blood UA Trace (A) Negative    Nitrite, UA Negative Negative    Urobilinogen, UA Negative <2.0 EU/dL    Leukocytes, UA Negative Negative   Protime-INR   Result Value Ref Range    Prothrombin Time 10.9 9.0 - 12.5 sec    INR 1.0 0.8 - 1.2   APTT   Result Value Ref Range    aPTT 26.9 21.0 - 32.0 sec   Urinalysis Microscopic   Result Value Ref Range    RBC, UA 2 0 - 4 /hpf    WBC, UA 4 0 - 5 /hpf    Bacteria, UA None None-Occ /hpf    Yeast, UA Rare (A) None    Squam Epithel, UA 2 /hpf    Hyaline Casts, UA 0 0-1/lpf /lpf    Microscopic Comment SEE COMMENT               The Emergency Provider reviewed the vital signs and test results, which are outlined above.     ED Discussion     6:22 PM: Discussed case with Ramon Waggoner NP ([facility]). Dr. Kapoor agrees with current care and management of pt and accepts admission.   Admitting Service: Hospital medicine   Admitting Physician: Dr. Kapoor  Admit to: Obs      6:29 PM: Re-evaluated pt. I have discussed test results, shared treatment plan, and the need for admission with  patient and family at bedside. Pt and family express understanding at this time and agree with all information. All questions answered. Pt and family have no further questions or concerns at this time. Pt is ready for admit.    ED Medication(s):  Medications - No data to display    New Prescriptions    No medications on file                 Medical Decision Making:   Clinical Tests:   Lab Tests: Reviewed and Ordered             Scribe Attestation:   Scribe #1: I performed the above scribed service and the documentation accurately describes the services I performed. I attest to the accuracy of the note.     Attending:   Physician Attestation Statement for Scribe #1: I, Kip Gonzales MD, personally performed the services described in this documentation, as scribed by Evelyn Lucia, in my presence, and it is both accurate and complete.           Clinical Impression       ICD-10-CM ICD-9-CM   1. Melena K92.1 578.1   2. Cirrhosis of liver without ascites, unspecified hepatic cirrhosis type K74.60 571.5       Disposition:   Disposition: Placed in Observation  Condition: Fair         Kip Gonzales MD  02/18/19 1913

## 2019-02-19 PROBLEM — K92.2 GI BLEED: Status: ACTIVE | Noted: 2019-02-18

## 2019-02-19 LAB
ALBUMIN SERPL BCP-MCNC: 2 G/DL
ALP SERPL-CCNC: 224 U/L
ALT SERPL W/O P-5'-P-CCNC: 36 U/L
ANION GAP SERPL CALC-SCNC: 4 MMOL/L
AST SERPL-CCNC: 71 U/L
BASOPHILS # BLD AUTO: 0.03 K/UL
BASOPHILS NFR BLD: 0.6 %
BILIRUB SERPL-MCNC: 0.9 MG/DL
BUN SERPL-MCNC: 28 MG/DL
CALCIUM SERPL-MCNC: 7.9 MG/DL
CHLORIDE SERPL-SCNC: 109 MMOL/L
CHOLEST SERPL-MCNC: 119 MG/DL
CHOLEST/HDLC SERPL: 3.8 {RATIO}
CO2 SERPL-SCNC: 25 MMOL/L
CREAT SERPL-MCNC: 1.2 MG/DL
DIFFERENTIAL METHOD: ABNORMAL
EOSINOPHIL # BLD AUTO: 0.1 K/UL
EOSINOPHIL NFR BLD: 1.7 %
ERYTHROCYTE [DISTWIDTH] IN BLOOD BY AUTOMATED COUNT: 14 %
EST. GFR  (AFRICAN AMERICAN): 55 ML/MIN/1.73 M^2
EST. GFR  (NON AFRICAN AMERICAN): 48 ML/MIN/1.73 M^2
ESTIMATED AVG GLUCOSE: 103 MG/DL
FERRITIN SERPL-MCNC: 721 NG/ML
GLUCOSE SERPL-MCNC: 106 MG/DL
HBA1C MFR BLD HPLC: 5.2 %
HCT VFR BLD AUTO: 30.7 %
HCT VFR BLD AUTO: 30.7 %
HCT VFR BLD AUTO: 31.4 %
HCT VFR BLD AUTO: 31.6 %
HCT VFR BLD AUTO: 34 %
HDLC SERPL-MCNC: 31 MG/DL
HDLC SERPL: 26.1 %
HGB BLD-MCNC: 10.2 G/DL
HGB BLD-MCNC: 10.2 G/DL
HGB BLD-MCNC: 10.4 G/DL
HGB BLD-MCNC: 10.7 G/DL
HGB BLD-MCNC: 11.3 G/DL
INR PPP: 1.1
IRON SERPL-MCNC: 157 UG/DL
LDLC SERPL CALC-MCNC: 74.4 MG/DL
LYMPHOCYTES # BLD AUTO: 1.6 K/UL
LYMPHOCYTES NFR BLD: 33.1 %
MCH RBC QN AUTO: 31.9 PG
MCHC RBC AUTO-ENTMCNC: 33.2 G/DL
MCV RBC AUTO: 96 FL
MONOCYTES # BLD AUTO: 0.5 K/UL
MONOCYTES NFR BLD: 10.2 %
NEUTROPHILS # BLD AUTO: 2.6 K/UL
NEUTROPHILS NFR BLD: 54.4 %
NONHDLC SERPL-MCNC: 88 MG/DL
OB PNL STL: POSITIVE
PLATELET # BLD AUTO: 89 K/UL
PMV BLD AUTO: 11.9 FL
POCT GLUCOSE: 117 MG/DL (ref 70–110)
POCT GLUCOSE: 152 MG/DL (ref 70–110)
POCT GLUCOSE: 170 MG/DL (ref 70–110)
POCT GLUCOSE: 99 MG/DL (ref 70–110)
POTASSIUM SERPL-SCNC: 4.1 MMOL/L
PROT SERPL-MCNC: 5.3 G/DL
PROTHROMBIN TIME: 11.5 SEC
RBC # BLD AUTO: 3.2 M/UL
SATURATED IRON: 55 %
SODIUM SERPL-SCNC: 138 MMOL/L
TOTAL IRON BINDING CAPACITY: 283 UG/DL
TRANSFERRIN SERPL-MCNC: 191 MG/DL
TRIGL SERPL-MCNC: 68 MG/DL
TSH SERPL DL<=0.005 MIU/L-ACNC: 0.88 UIU/ML
WBC # BLD AUTO: 4.71 K/UL

## 2019-02-19 PROCEDURE — 96361 HYDRATE IV INFUSION ADD-ON: CPT | Performed by: EMERGENCY MEDICINE

## 2019-02-19 PROCEDURE — 83036 HEMOGLOBIN GLYCOSYLATED A1C: CPT

## 2019-02-19 PROCEDURE — 99215 OFFICE O/P EST HI 40 MIN: CPT | Mod: ,,, | Performed by: INTERNAL MEDICINE

## 2019-02-19 PROCEDURE — 99215 PR OFFICE/OUTPT VISIT, EST, LEVL V, 40-54 MIN: ICD-10-PCS | Mod: ,,, | Performed by: INTERNAL MEDICINE

## 2019-02-19 PROCEDURE — 63600175 PHARM REV CODE 636 W HCPCS: Performed by: NURSE PRACTITIONER

## 2019-02-19 PROCEDURE — 85018 HEMOGLOBIN: CPT

## 2019-02-19 PROCEDURE — 36415 COLL VENOUS BLD VENIPUNCTURE: CPT

## 2019-02-19 PROCEDURE — 80061 LIPID PANEL: CPT

## 2019-02-19 PROCEDURE — 82272 OCCULT BLD FECES 1-3 TESTS: CPT

## 2019-02-19 PROCEDURE — 80053 COMPREHEN METABOLIC PANEL: CPT

## 2019-02-19 PROCEDURE — 84443 ASSAY THYROID STIM HORMONE: CPT

## 2019-02-19 PROCEDURE — 85610 PROTHROMBIN TIME: CPT

## 2019-02-19 PROCEDURE — 83540 ASSAY OF IRON: CPT

## 2019-02-19 PROCEDURE — 25000003 PHARM REV CODE 250: Performed by: NURSE PRACTITIONER

## 2019-02-19 PROCEDURE — 25000242 PHARM REV CODE 250 ALT 637 W/ HCPCS: Performed by: NURSE PRACTITIONER

## 2019-02-19 PROCEDURE — 25000003 PHARM REV CODE 250: Performed by: PHYSICIAN ASSISTANT

## 2019-02-19 PROCEDURE — 99900035 HC TECH TIME PER 15 MIN (STAT)

## 2019-02-19 PROCEDURE — 82728 ASSAY OF FERRITIN: CPT

## 2019-02-19 PROCEDURE — C9113 INJ PANTOPRAZOLE SODIUM, VIA: HCPCS | Performed by: NURSE PRACTITIONER

## 2019-02-19 PROCEDURE — 85018 HEMOGLOBIN: CPT | Mod: 91

## 2019-02-19 PROCEDURE — 94640 AIRWAY INHALATION TREATMENT: CPT | Mod: 59

## 2019-02-19 PROCEDURE — 96376 TX/PRO/DX INJ SAME DRUG ADON: CPT | Performed by: EMERGENCY MEDICINE

## 2019-02-19 PROCEDURE — G0378 HOSPITAL OBSERVATION PER HR: HCPCS

## 2019-02-19 PROCEDURE — 85014 HEMATOCRIT: CPT | Mod: 91

## 2019-02-19 PROCEDURE — 85014 HEMATOCRIT: CPT

## 2019-02-19 PROCEDURE — 85025 COMPLETE CBC W/AUTO DIFF WBC: CPT

## 2019-02-19 RX ORDER — FUROSEMIDE 20 MG/1
20 TABLET ORAL DAILY
Status: DISCONTINUED | OUTPATIENT
Start: 2019-02-19 | End: 2019-02-20 | Stop reason: HOSPADM

## 2019-02-19 RX ORDER — BISACODYL 5 MG
20 TABLET, DELAYED RELEASE (ENTERIC COATED) ORAL ONCE
Status: COMPLETED | OUTPATIENT
Start: 2019-02-19 | End: 2019-02-19

## 2019-02-19 RX ORDER — POLYETHYLENE GLYCOL 3350, SODIUM SULFATE ANHYDROUS, SODIUM BICARBONATE, SODIUM CHLORIDE, POTASSIUM CHLORIDE 236; 22.74; 6.74; 5.86; 2.97 G/4L; G/4L; G/4L; G/4L; G/4L
4000 POWDER, FOR SOLUTION ORAL ONCE
Status: COMPLETED | OUTPATIENT
Start: 2019-02-19 | End: 2019-02-19

## 2019-02-19 RX ADMIN — GABAPENTIN 100 MG: 100 CAPSULE ORAL at 09:02

## 2019-02-19 RX ADMIN — IPRATROPIUM BROMIDE 0.5 MG: 0.5 SOLUTION RESPIRATORY (INHALATION) at 07:02

## 2019-02-19 RX ADMIN — PANTOPRAZOLE SODIUM 40 MG: 40 INJECTION, POWDER, LYOPHILIZED, FOR SOLUTION INTRAVENOUS at 09:02

## 2019-02-19 RX ADMIN — FUROSEMIDE 20 MG: 20 TABLET ORAL at 04:02

## 2019-02-19 RX ADMIN — LISINOPRIL 10 MG: 10 TABLET ORAL at 09:02

## 2019-02-19 RX ADMIN — GABAPENTIN 100 MG: 100 CAPSULE ORAL at 02:02

## 2019-02-19 RX ADMIN — BISACODYL 20 MG: 5 TABLET, COATED ORAL at 02:02

## 2019-02-19 RX ADMIN — POLYETHYLENE GLYCOL 3350, SODIUM SULFATE ANHYDROUS, SODIUM BICARBONATE, SODIUM CHLORIDE, POTASSIUM CHLORIDE 4000 ML: 236; 22.74; 6.74; 5.86; 2.97 POWDER, FOR SOLUTION ORAL at 04:02

## 2019-02-19 RX ADMIN — SPIRONOLACTONE 25 MG: 25 TABLET ORAL at 09:02

## 2019-02-19 NOTE — H&P (VIEW-ONLY)
[facility] -   Gastroenterology  Consult Note    Patient Name: Chani Aviles  MRN: [id number]  Admission Date: 2/18/2019  Hospital Length of Stay: 0 days  Code Status: Full Code   Attending Provider: Malik Martinez MD   Consulting Provider: Rodrick Natarajan PA-C  Primary Care Physician: Primary Doctor No  Principal Problem:Blood in the stool    Inpatient consult to Gastroenterology  Consult performed by: Rodrick Natarajan PA-C  Consult ordered by: Rama Peres NP  Reason for consult: Dark red rectal bleeding        Subjective:     HPI:  The patient presented to the ER for blood in her stool. She says this started four days ago. She describes the stool as dark with red all through it. She also reports seeing the same when she wipes. She denies nausea, vomiting, abdominal pain, heartburn or change in appetite. She reports having five BMs yesterday and none today. She has a history of constipation but this has been well controlled with lactulose. She reports rectal pain. She is on an iron supplement but has been for over a year. She has a liver cirrhosis with HCC followed by Dr. Yeboah. She had an EGD in the last year which was reportedly normal. She has never had a colonoscopy. She reports compliance with medications and treatment. In January, Hgb was 13.6. On admit, Hgb was 11.8. Today it is 10.2. BUN 29 and creatinine 1.4.      Past Medical History:   Diagnosis Date    Compensated HCV cirrhosis 12/7/2018    Constipation 12/7/2018    Essential hypertension 7/17/2015    Last Assessment & Plan:  Not @ Goal. No b/p log brought to clinic as advised. Will increase Losartan to 100 mg daily and + Norvasc 10 mg daily. 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 No b/s log brought to clinic visit       History reviewed. No pertinent surgical history.    Review of patient's allergies indicates:  No Known Allergies  Family History     None        Tobacco Use    Smoking status: Light Tobacco Smoker     Types: Cigarettes    Smokeless tobacco: Never Used   Substance and Sexual Activity    Alcohol use: No     Frequency: Never     Binge frequency: Never    Drug use: No    Sexual activity: No     Review of Systems   Constitutional: Negative for fever.   HENT: Negative for hearing loss.    Eyes: Negative for visual disturbance.   Respiratory: Negative for cough and shortness of breath.    Cardiovascular: Negative for chest pain.   Gastrointestinal:        As per HPI.   Genitourinary: Negative for dysuria, frequency and hematuria.   Musculoskeletal: Negative for arthralgias and back pain.   Skin: Negative for rash.   Neurological: Negative for seizures, syncope, numbness and headaches.   Hematological: Does not bruise/bleed easily.   Psychiatric/Behavioral: The patient is not nervous/anxious.      Objective:     Vital Signs (Most Recent):  Temp: 98.3 °F (36.8 °C) (02/19/19 0804)  Pulse: 85 (02/19/19 0804)  Resp: 16 (02/19/19 0804)  BP: (!) 124/90 (02/19/19 0804)  SpO2: 97 % (02/19/19 0804) Vital Signs (24h Range):  Temp:  [97.9 °F (36.6 °C)-98.5 °F (36.9 °C)] 98.3 °F (36.8 °C)  Pulse:  [75-85] 85  Resp:  [14-18] 16  SpO2:  [96 %-100 %] 97 %  BP: (124-176)/(61-90) 124/90     Weight: 69 kg (152 lb 1.9 oz) (02/18/19 1611)  Body mass index is 24.55 kg/m².      Intake/Output Summary (Last 24 hours) at 2/19/2019 1112  Last data filed at 2/19/2019 0900  Gross per 24 hour   Intake 350 ml   Output --   Net 350 ml       Lines/Drains/Airways     Peripheral Intravenous Line                 Peripheral IV - Single Lumen 02/18/19 1733 Right Hand less than 1 day                Physical Exam   Constitutional: She is oriented to person, place, and time.  She appears well-developed and well-nourished.   HENT:   Head: Normocephalic and atraumatic.   Eyes: EOM are normal.   Neck: Normal range of motion. Neck supple. Carotid bruit is not present.   Cardiovascular: Normal rate and regular rhythm.   No murmur heard.  Pulmonary/Chest: Effort normal and breath sounds normal. No respiratory distress. She has no wheezes.   Abdominal: Soft. Normal appearance and bowel sounds are normal. She exhibits no distension and no mass. There is tenderness in the right lower quadrant.   RECTAL: No external hemorrhoids or fissure; however, there is tenderness on exam. Normal sphincter tone. No internal palpable mass. No gross blood or stool on exam.    Musculoskeletal: She exhibits no edema.   Neurological: She is alert and oriented to person, place, and time. No cranial nerve deficit.   Skin: Skin is warm and dry. No rash noted.   Psychiatric: Her behavior is normal.       Significant Labs:  CBC:   Recent Labs   Lab 02/18/19  1725 02/19/19  0045 02/19/19  0523   WBC 5.59  --  4.71   HGB 11.8* 10.7* 10.2*  10.2*   HCT 34.8* 31.6* 30.7*  30.7*   PLT 98*  --  89*     CMP:   Recent Labs   Lab 02/19/19  0523      CALCIUM 7.9*   ALBUMIN 2.0*   PROT 5.3*      K 4.1   CO2 25      BUN 28*   CREATININE 1.2   ALKPHOS 224*   ALT 36   AST 71*   BILITOT 0.9     Coagulation:   Recent Labs   Lab 02/18/19  1725   INR 1.0   APTT 26.9       Significant Imaging:  Imaging results within the past 24 hours have been reviewed.    Assessment/Plan:     * Blood in the stool    It isn't clear if this is an upper or lower GI bleed. She is at risk for varices, but has also never had a colonoscopy.   We will prep her today for an EGD and Colonoscopy tomorrow.   See diet orders.   Continue to monitor H/H and transfuse as indicated.      Compensated HCV cirrhosis    Check INR and CMP daily.     MELD-Na score: 8 at 2/19/2019  5:23 AM  MELD score: 8 at 2/19/2019  5:23 AM  Calculated from:  Serum  Creatinine: 1.2 mg/dL at 2/19/2019  5:23 AM  Serum Sodium: 138 mmol/L (Rounded to 137 mmol/L) at 2/19/2019  5:23 AM  Total Bilirubin: 0.9 mg/dL (Rounded to 1 mg/dL) at 2/19/2019  5:23 AM  INR(ratio): 1 at 2/18/2019  5:25 PM  Age: 63 years           Thank you for your consult. I will follow-up with patient. Please contact us if you have any additional questions.    Rodrick Natarajan PA-C  Gastroenterology  [facility]

## 2019-02-19 NOTE — SUBJECTIVE & OBJECTIVE
Interval History: Denies abdominal pain, no further blood in stool today   Review of Systems   Constitutional: Negative for appetite change, chills, diaphoresis, fatigue, fever and unexpected weight change.   HENT: Negative for congestion, nosebleeds, sinus pressure and sore throat.    Eyes: Negative for pain, discharge and visual disturbance.   Respiratory: Negative for cough, chest tightness, shortness of breath, wheezing and stridor.    Cardiovascular: Negative for chest pain, palpitations and leg swelling.   Gastrointestinal: Positive for blood in stool. Negative for abdominal distention, abdominal pain, constipation, diarrhea, nausea and vomiting.   Endocrine: Negative for cold intolerance and heat intolerance.   Genitourinary: Negative for difficulty urinating, dysuria, flank pain, frequency and urgency.   Musculoskeletal: Negative for arthralgias, back pain, joint swelling, myalgias, neck pain and neck stiffness.   Skin: Negative for rash and wound.   Allergic/Immunologic: Negative for food allergies and immunocompromised state.   Neurological: Negative for dizziness, seizures, syncope, facial asymmetry, speech difficulty, weakness, light-headedness, numbness and headaches.   Hematological: Negative for adenopathy.   Psychiatric/Behavioral: Negative for agitation, confusion and hallucinations.     Objective:     Vital Signs (Most Recent):  Temp: 98.1 °F (36.7 °C) (02/19/19 1226)  Pulse: 83 (02/19/19 1226)  Resp: 20 (02/19/19 1226)  BP: (!) 144/71 (02/19/19 1226)  SpO2: 96 % (02/19/19 1226) Vital Signs (24h Range):  Temp:  [97.9 °F (36.6 °C)-98.5 °F (36.9 °C)] 98.1 °F (36.7 °C)  Pulse:  [75-85] 83  Resp:  [14-20] 20  SpO2:  [96 %-100 %] 96 %  BP: (124-176)/(61-90) 144/71     Weight: 69 kg (152 lb 1.9 oz)  Body mass index is 24.55 kg/m².    Intake/Output Summary (Last 24 hours) at 2/19/2019 1431  Last data filed at 2/19/2019 0900  Gross per 24 hour   Intake 350 ml   Output --   Net 350 ml      Physical Exam    Constitutional: She is oriented to person, place, and time. She appears well-developed and well-nourished. No distress.   HENT:   Head: Normocephalic and atraumatic.   Nose: Nose normal.   Mouth/Throat: Oropharynx is clear and moist.   Eyes: Conjunctivae and EOM are normal. No scleral icterus.   Neck: Normal range of motion. Neck supple. No tracheal deviation present.   Cardiovascular: Normal rate, regular rhythm, normal heart sounds and intact distal pulses. Exam reveals no gallop and no friction rub.   No murmur heard.  Pulmonary/Chest: Effort normal and breath sounds normal. No stridor. No respiratory distress. She has no wheezes. She has no rales. She exhibits no tenderness.   Abdominal: Soft. Bowel sounds are normal. She exhibits no distension and no mass. There is no tenderness. There is no rebound and no guarding.   Musculoskeletal: Normal range of motion. She exhibits no edema, tenderness or deformity.   Neurological: She is alert and oriented to person, place, and time. No cranial nerve deficit. She exhibits normal muscle tone. Coordination normal.   Skin: Skin is warm and dry. No rash noted. She is not diaphoretic. No erythema. No pallor.   Psychiatric: She has a normal mood and affect. Her behavior is normal. Thought content normal.   Nursing note and vitals reviewed.      Significant Labs:   BMP:   Recent Labs   Lab 02/18/19  1725 02/19/19  0523   * 106    138   K 4.6 4.1    109   CO2 27 25   BUN 29* 28*   CREATININE 1.4 1.2   CALCIUM 8.7 7.9*   MG 2.2  --      CBC:   Recent Labs   Lab 02/18/19  1725 02/19/19  0045 02/19/19  0523 02/19/19  1125   WBC 5.59  --  4.71  --    HGB 11.8* 10.7* 10.2*  10.2* 10.4*   HCT 34.8* 31.6* 30.7*  30.7* 31.4*   PLT 98*  --  89*  --      CMP:   Recent Labs   Lab 02/18/19  1725 02/19/19  0523    138   K 4.6 4.1    109   CO2 27 25   * 106   BUN 29* 28*   CREATININE 1.4 1.2   CALCIUM 8.7 7.9*   PROT 6.6 5.3*   ALBUMIN 2.3* 2.0*    BILITOT 0.5 0.9   ALKPHOS 312* 224*   AST 82* 71*   ALT 40 36   ANIONGAP 7* 4*   EGFRNONAA 40* 48*     All pertinent labs within the past 24 hours have been reviewed.    Significant Imaging:  Imaging Results    None

## 2019-02-19 NOTE — ASSESSMENT & PLAN NOTE
Given patient history this is likely intermittent/chronic, additionally patient on iron supplement.  BUN mildly elevated 29.   Clear liquid diet for now and NPO after midnight  Check occult stool.    80 mg Protonix tonight.  Continue Protonix 40 mg b.i.d.  Monitor H&H type and screen and transfuse if needed.  Consider GI consult pending course.  Further evaluation/diagnostics/interventions/consults pending course

## 2019-02-19 NOTE — H&P
[facility] Hospital Medicine  History & Physical    Patient Name: Chani Aviles  MRN: [id number]  Admission Date: 2/18/2019  Attending Physician: Taz Kapoor MD   Primary Care Provider: Primary Doctor No         Patient information was obtained from patient, past medical records and ER records.     Subjective:     Principal Problem:Melena    Chief Complaint:   Chief Complaint   Patient presents with    Rectal Bleeding     dark red blood x4 days with; pain to RLQ and rectum        HPI: Chani Aviles is a 63 y.o. female   with history including hep C, hepatocellular carcinoma, esophageal varices, H pylori infection last year, presents for melena which onset gradually 4 days ago. Symptoms are episodic . No mitigating or exacerbating factors reported. Associated sxs include rectal pain and diffuse abd pain. No prior treatment.  No further complaints or concerns at this time.   patient was evaluated in the emergency room H&H 11.8 and 34.8, BUN 29.  To note patient was recently seen at [facility] February 12, 2019 for chest pain workup, patient denies any coronary complaints at this time.  See Care everywhere.  Hospital Medicine was consulted patient placed in observation.        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    Review of patient's allergies indicates:  No Known Allergies    No current facility-administered medications on file prior to encounter.      Current Outpatient Medications on File Prior to Encounter   Medication Sig    amLODIPine (NORVASC) 10 MG tablet amlodipine 10 mg tablet    amLODIPine (NORVASC) 10 MG tablet Take 10 mg by mouth nightly.    furosemide (LASIX) 20 MG tablet Take 20 mg by mouth once daily.    gabapentin (NEURONTIN) 300 MG capsule Take 1 capsule (300 mg total) by mouth 3  (three) times daily.    insulin lispro protamin-lispro (HUMALOG MIX 75-25 KWIKPEN) 100 unit/mL (75-25) InPn 25 UNITS SUBCUTANEOUSLY IN AM AND 15 UNITS AT NIGHT    lisinopril 10 MG tablet Take 10 mg by mouth every morning.    losartan (COZAAR) 100 MG tablet Take 100 mg by mouth once daily.    HYDROcodone-acetaminophen (NORCO) 5-325 mg per tablet Take 1 tablet by mouth every 4 (four) hours as needed. for pain.    ibandronate (BONIVA) 150 mg tablet ibandronate 150 mg tablet    iron bis-gly-FA-W-S49-Lf-succ 150 mg iron(21) -400 mcg (7) Tab Take 1 tablet by mouth.    lactulose (CHRONULAC) 20 gram/30 mL Soln Take 20 g by mouth.    multivitamin (ONE DAILY MULTIVITAMIN) per tablet Take 1 tablet by mouth.    spironolactone (ALDACTONE) 25 MG tablet Take 25 mg by mouth 2 (two) times daily.    tiotropium (SPIRIVA WITH HANDIHALER) 18 mcg inhalation capsule Spiriva with HandiHaler 18 mcg and inhalation capsules    tiotropium bromide (SPIRIVA RESPIMAT) 2.5 mcg/actuation Mist Spiriva Respimat 2.5 mcg/actuation solution for inhalation     Family History     Reviewed and not Pertinent           Tobacco Use    Smoking status: Light Tobacco Smoker     Types: Cigarettes    Smokeless tobacco: Never Used   Substance and Sexual Activity    Alcohol use: No     Frequency: Never     Binge frequency: Never    Drug use: No    Sexual activity: No     Review of Systems   Constitutional: Negative for chills, diaphoresis, fatigue and fever.   HENT: Negative for congestion, sore throat and voice change.    Eyes: Negative for photophobia and visual disturbance.   Respiratory: Negative for cough, shortness of breath, wheezing and stridor.    Cardiovascular: Negative for chest pain and leg swelling.   Gastrointestinal: Positive for abdominal pain, blood in stool and rectal pain. Negative for abdominal distention, constipation, diarrhea, nausea and vomiting.   Endocrine: Negative for polydipsia, polyphagia and polyuria.   Genitourinary:  Negative for difficulty urinating, dysuria, flank pain, pelvic pain, urgency and vaginal discharge.   Musculoskeletal: Negative for back pain, joint swelling, neck pain and neck stiffness.   Skin: Negative for color change and rash.   Allergic/Immunologic: Negative for immunocompromised state.   Neurological: Negative for dizziness, syncope, weakness, numbness and headaches.   Hematological: Does not bruise/bleed easily.   Psychiatric/Behavioral: Negative for agitation, behavioral problems and confusion.     Objective:     Vital Signs (Most Recent):  Temp: 97.9 °F (36.6 °C) (02/18/19 1941)  Pulse: 80 (02/18/19 1941)  Resp: 15 (02/18/19 1941)  BP: (!) 175/84 (02/18/19 1941)  SpO2: 97 % (02/18/19 1941) Vital Signs (24h Range):  Temp:  [97.9 °F (36.6 °C)-98 °F (36.7 °C)] 97.9 °F (36.6 °C)  Pulse:  [79-83] 80  Resp:  [15-18] 15  SpO2:  [97 %-100 %] 97 %  BP: (143-176)/(79-84) 175/84     Weight: 69 kg (152 lb 1.9 oz)  Body mass index is 24.55 kg/m².    Physical Exam   Constitutional: She is oriented to person, place, and time. She appears well-developed and well-nourished. No distress.   HENT:   Head: Normocephalic and atraumatic.   Nose: Nose normal.   Eyes: Conjunctivae and EOM are normal. Pupils are equal, round, and reactive to light. No scleral icterus.   Neck: Normal range of motion. Neck supple. No tracheal deviation present.   Cardiovascular: Normal rate, regular rhythm, normal heart sounds and intact distal pulses.   No murmur heard.  Pulmonary/Chest: Effort normal and breath sounds normal. No stridor. No respiratory distress. She has no wheezes. She has no rales.   Abdominal: Soft. Bowel sounds are normal. She exhibits distension ( Mild ascites). There is tenderness ( And generalized but worse to right upper and lower). There is no guarding.   Genitourinary: Rectal exam shows guaiac negative stool.   Genitourinary Comments: Check occult stool      Musculoskeletal: Normal range of motion. She exhibits no edema  or deformity.   Neurological: She is alert and oriented to person, place, and time. No cranial nerve deficit.   Skin: Skin is warm and dry. Capillary refill takes less than 2 seconds. No rash noted. She is not diaphoretic.   Psychiatric: She has a normal mood and affect. Her behavior is normal. Judgment and thought content normal.   Nursing note and vitals reviewed.           CRANIAL NERVES      CN III, IV, VI   Pupils are equal, round, and reactive to light.  Extraocular motions are normal.           Significant Labs: All pertinent labs within the past 24 hours have been reviewed.  Results for orders placed or performed during the hospital encounter of 02/18/19   CBC auto differential   Result Value Ref Range    WBC 5.59 3.90 - 12.70 K/uL    RBC 3.66 (L) 4.00 - 5.40 M/uL    Hemoglobin 11.8 (L) 12.0 - 16.0 g/dL    Hematocrit 34.8 (L) 37.0 - 48.5 %    MCV 95 82 - 98 fL    MCH 32.2 (H) 27.0 - 31.0 pg    MCHC 33.9 32.0 - 36.0 g/dL    RDW 14.1 11.5 - 14.5 %    Platelets 98 (L) 150 - 350 K/uL    MPV 12.6 9.2 - 12.9 fL    Gran # (ANC) 3.5 1.8 - 7.7 K/uL    Lymph # 1.5 1.0 - 4.8 K/uL    Mono # 0.5 0.3 - 1.0 K/uL    Eos # 0.1 0.0 - 0.5 K/uL    Baso # 0.03 0.00 - 0.20 K/uL    Gran% 61.8 38.0 - 73.0 %    Lymph% 27.0 18.0 - 48.0 %    Mono% 8.9 4.0 - 15.0 %    Eosinophil% 1.8 0.0 - 8.0 %    Basophil% 0.5 0.0 - 1.9 %    Differential Method Automated    Comprehensive metabolic panel   Result Value Ref Range    Sodium 142 136 - 145 mmol/L    Potassium 4.6 3.5 - 5.1 mmol/L    Chloride 108 95 - 110 mmol/L    CO2 27 23 - 29 mmol/L    Glucose 123 (H) 70 - 110 mg/dL    BUN, Bld 29 (H) 8 - 23 mg/dL    Creatinine 1.4 0.5 - 1.4 mg/dL    Calcium 8.7 8.7 - 10.5 mg/dL    Total Protein 6.6 6.0 - 8.4 g/dL    Albumin 2.3 (L) 3.5 - 5.2 g/dL    Total Bilirubin 0.5 0.1 - 1.0 mg/dL    Alkaline Phosphatase 312 (H) 55 - 135 U/L    AST 82 (H) 10 - 40 U/L    ALT 40 10 - 44 U/L    Anion Gap 7 (L) 8 - 16 mmol/L    eGFR if  46 (A) >60  mL/min/1.73 m^2    eGFR if non African American 40 (A) >60 mL/min/1.73 m^2   Urinalysis, Reflex to Urine Culture Urine, Clean Catch   Result Value Ref Range    Specimen UA Urine, Clean Catch     Color, UA Yellow Yellow, Straw, Gely    Appearance, UA Clear Clear    pH, UA 6.0 5.0 - 8.0    Specific Gravity, UA 1.025 1.005 - 1.030    Protein, UA 2+ (A) Negative    Glucose, UA Negative Negative    Ketones, UA Negative Negative    Bilirubin (UA) Negative Negative    Occult Blood UA Trace (A) Negative    Nitrite, UA Negative Negative    Urobilinogen, UA Negative <2.0 EU/dL    Leukocytes, UA Negative Negative   Protime-INR   Result Value Ref Range    Prothrombin Time 10.9 9.0 - 12.5 sec    INR 1.0 0.8 - 1.2   APTT   Result Value Ref Range    aPTT 26.9 21.0 - 32.0 sec   Urinalysis Microscopic   Result Value Ref Range    RBC, UA 2 0 - 4 /hpf    WBC, UA 4 0 - 5 /hpf    Bacteria, UA None None-Occ /hpf    Yeast, UA Rare (A) None    Squam Epithel, UA 2 /hpf    Hyaline Casts, UA 0 0-1/lpf /lpf    Microscopic Comment SEE COMMENT    Magnesium   Result Value Ref Range    Magnesium 2.2 1.6 - 2.6 mg/dL   Phosphorus   Result Value Ref Range    Phosphorus 3.1 2.7 - 4.5 mg/dL   Ethanol   Result Value Ref Range    Alcohol, Medical, Serum <10 <10 mg/dL   Type & Screen   Result Value Ref Range    Group & Rh B POS     Indirect Albino NEG    POCT glucose   Result Value Ref Range    POCT Glucose 109 70 - 110 mg/dL       Significant Imaging: I have reviewed all pertinent imaging results/findings within the past 24 hours.   Imaging Results    Chest and abdomen x-ray pending       I have personally reviewed the patients labs, imaging, ekg and discussed the patient case in detail with the Er provider    Assessment/Plan:     * Melena    Given patient history this is likely intermittent/chronic, additionally patient on iron supplement.  BUN mildly elevated 29.   Clear liquid diet for now and NPO after midnight  Check occult stool.    80 mg Protonix  tonight.  Continue Protonix 40 mg b.i.d.  Monitor H&H type and screen and transfuse if needed.  Consider GI consult pending course.  Further evaluation/diagnostics/interventions/consults pending course        Uncontrolled diabetes mellitus type 2 without complications    Check A1c, lipid panel  sliding scale insulin  monitor       Obstructive airway disease    Stable no respiratory complaints  Continue Atrovent.     Iron deficiency anemia    Related to chronic bleeding.  Check iron level.  Consider further anemia workup pending course.  Hold iron tab for now and monitor for resolution in symptoms  Further evaluation/diagnostics/interventions/consults pending course        Essential hypertension    Continue patient home meds  Patient has been on losartan and Norvasc states that losartan was changed to lisinopril today.  Trans elevated in the emergency room noted. Will start lisinopril tonight.  Clonidine p.r.n.   Monitor trends and adjustments as needed optimization pending course.  Patient should keep outpatient follow-up as recommended  Further evaluation/diagnostics/interventions/consults pending course        Compensated HCV cirrhosis    LFTs stable check alcohol level.  Monitor MELD labs  INR negative.  Consider GI consult pending course  Was started on IV fluids in ER patient's vital signs hypertensive.  Will hold additional IV fluids at this time, monitor close.  Further evaluation/diagnostics/interventions/consults pending course       MELD-Na score: 10 at 2/18/2019  5:25 PM  MELD score: 10 at 2/18/2019  5:25 PM  Calculated from:  Serum Creatinine: 1.4 mg/dL at 2/18/2019  5:25 PM  Serum Sodium: 142 mmol/L (Rounded to 137 mmol/L) at 2/18/2019  5:25 PM  Total Bilirubin: 0.5 mg/dL (Rounded to 1 mg/dL) at 2/18/2019  5:25 PM  INR(ratio): 1 at 2/18/2019  5:25 PM  Age: 63 years         VTE Risk Mitigation (From admission, onward)        Ordered     IP VTE HIGH RISK PATIENT  Once      02/18/19 1934     Place  sequential compression device  Until discontinued      02/18/19 1934     Place CAROL hose  Until discontinued      02/18/19 1934          **Portions of this note has been dictated using speech recognition software, M*Modal Fluency Direct; although, time has been taken to proof read and revise it may still contain misspellings, grammatical and or other errors.*    Jacques Pantoja NP  Department of Hospital Medicine   [facility]

## 2019-02-19 NOTE — HPI
The patient presented to the ER for blood in her stool. She says this started four days ago. She describes the stool as dark with red all through it. She also reports seeing the same when she wipes. She denies nausea, vomiting, abdominal pain, heartburn or change in appetite. She reports having five BMs yesterday and none today. She has a history of constipation but this has been well controlled with lactulose. She reports rectal pain. She is on an iron supplement but has been for over a year. She has a liver cirrhosis with HCC followed by Dr. Yeboah. She had an EGD in the last year which was reportedly normal. She has never had a colonoscopy. She reports compliance with medications and treatment. In January, Hgb was 13.6. On admit, Hgb was 11.8. Today it is 10.2. BUN 29 and creatinine 1.4.

## 2019-02-19 NOTE — CONSULTS
[facility] -   Gastroenterology  Consult Note    Patient Name: Chani Aviles  MRN: [id number]  Admission Date: 2/18/2019  Hospital Length of Stay: 0 days  Code Status: Full Code   Attending Provider: Malik Martinez MD   Consulting Provider: Rodrick Natarajan PA-C  Primary Care Physician: Primary Doctor No  Principal Problem:Blood in the stool    Inpatient consult to Gastroenterology  Consult performed by: Rodrick Natarajan PA-C  Consult ordered by: Rama Peres NP  Reason for consult: Dark red rectal bleeding        Subjective:     HPI:  The patient presented to the ER for blood in her stool. She says this started four days ago. She describes the stool as dark with red all through it. She also reports seeing the same when she wipes. She denies nausea, vomiting, abdominal pain, heartburn or change in appetite. She reports having five BMs yesterday and none today. She has a history of constipation but this has been well controlled with lactulose. She reports rectal pain. She is on an iron supplement but has been for over a year. She has a liver cirrhosis with HCC followed by Dr. Yeboah. She had an EGD in the last year which was reportedly normal. She has never had a colonoscopy. She reports compliance with medications and treatment. In January, Hgb was 13.6. On admit, Hgb was 11.8. Today it is 10.2. BUN 29 and creatinine 1.4.      Past Medical History:   Diagnosis Date    Compensated HCV cirrhosis 12/7/2018    Constipation 12/7/2018    Essential hypertension 7/17/2015    Last Assessment & Plan:  Not @ Goal. No b/p log brought to clinic as advised. Will increase Losartan to 100 mg daily and + Norvasc 10 mg daily. 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 No b/s log brought to clinic visit       History reviewed. No pertinent surgical history.    Review of patient's allergies indicates:  No Known Allergies  Family History     None        Tobacco Use    Smoking status: Light Tobacco Smoker     Types: Cigarettes    Smokeless tobacco: Never Used   Substance and Sexual Activity    Alcohol use: No     Frequency: Never     Binge frequency: Never    Drug use: No    Sexual activity: No     Review of Systems   Constitutional: Negative for fever.   HENT: Negative for hearing loss.    Eyes: Negative for visual disturbance.   Respiratory: Negative for cough and shortness of breath.    Cardiovascular: Negative for chest pain.   Gastrointestinal:        As per HPI.   Genitourinary: Negative for dysuria, frequency and hematuria.   Musculoskeletal: Negative for arthralgias and back pain.   Skin: Negative for rash.   Neurological: Negative for seizures, syncope, numbness and headaches.   Hematological: Does not bruise/bleed easily.   Psychiatric/Behavioral: The patient is not nervous/anxious.      Objective:     Vital Signs (Most Recent):  Temp: 98.3 °F (36.8 °C) (02/19/19 0804)  Pulse: 85 (02/19/19 0804)  Resp: 16 (02/19/19 0804)  BP: (!) 124/90 (02/19/19 0804)  SpO2: 97 % (02/19/19 0804) Vital Signs (24h Range):  Temp:  [97.9 °F (36.6 °C)-98.5 °F (36.9 °C)] 98.3 °F (36.8 °C)  Pulse:  [75-85] 85  Resp:  [14-18] 16  SpO2:  [96 %-100 %] 97 %  BP: (124-176)/(61-90) 124/90     Weight: 69 kg (152 lb 1.9 oz) (02/18/19 1611)  Body mass index is 24.55 kg/m².      Intake/Output Summary (Last 24 hours) at 2/19/2019 1112  Last data filed at 2/19/2019 0900  Gross per 24 hour   Intake 350 ml   Output --   Net 350 ml       Lines/Drains/Airways     Peripheral Intravenous Line                 Peripheral IV - Single Lumen 02/18/19 1733 Right Hand less than 1 day                Physical Exam   Constitutional: She is oriented to person, place, and time.  She appears well-developed and well-nourished.   HENT:   Head: Normocephalic and atraumatic.   Eyes: EOM are normal.   Neck: Normal range of motion. Neck supple. Carotid bruit is not present.   Cardiovascular: Normal rate and regular rhythm.   No murmur heard.  Pulmonary/Chest: Effort normal and breath sounds normal. No respiratory distress. She has no wheezes.   Abdominal: Soft. Normal appearance and bowel sounds are normal. She exhibits no distension and no mass. There is tenderness in the right lower quadrant.   RECTAL: No external hemorrhoids or fissure; however, there is tenderness on exam. Normal sphincter tone. No internal palpable mass. No gross blood or stool on exam.    Musculoskeletal: She exhibits no edema.   Neurological: She is alert and oriented to person, place, and time. No cranial nerve deficit.   Skin: Skin is warm and dry. No rash noted.   Psychiatric: Her behavior is normal.       Significant Labs:  CBC:   Recent Labs   Lab 02/18/19  1725 02/19/19  0045 02/19/19  0523   WBC 5.59  --  4.71   HGB 11.8* 10.7* 10.2*  10.2*   HCT 34.8* 31.6* 30.7*  30.7*   PLT 98*  --  89*     CMP:   Recent Labs   Lab 02/19/19  0523      CALCIUM 7.9*   ALBUMIN 2.0*   PROT 5.3*      K 4.1   CO2 25      BUN 28*   CREATININE 1.2   ALKPHOS 224*   ALT 36   AST 71*   BILITOT 0.9     Coagulation:   Recent Labs   Lab 02/18/19  1725   INR 1.0   APTT 26.9       Significant Imaging:  Imaging results within the past 24 hours have been reviewed.    Assessment/Plan:     * Blood in the stool    It isn't clear if this is an upper or lower GI bleed. She is at risk for varices, but has also never had a colonoscopy.   We will prep her today for an EGD and Colonoscopy tomorrow.   See diet orders.   Continue to monitor H/H and transfuse as indicated.      Compensated HCV cirrhosis    Check INR and CMP daily.     MELD-Na score: 8 at 2/19/2019  5:23 AM  MELD score: 8 at 2/19/2019  5:23 AM  Calculated from:  Serum  Creatinine: 1.2 mg/dL at 2/19/2019  5:23 AM  Serum Sodium: 138 mmol/L (Rounded to 137 mmol/L) at 2/19/2019  5:23 AM  Total Bilirubin: 0.9 mg/dL (Rounded to 1 mg/dL) at 2/19/2019  5:23 AM  INR(ratio): 1 at 2/18/2019  5:25 PM  Age: 63 years           Thank you for your consult. I will follow-up with patient. Please contact us if you have any additional questions.    Rodrick Natarajan PA-C  Gastroenterology  [facility]

## 2019-02-19 NOTE — ASSESSMENT & PLAN NOTE
It isn't clear if this is an upper or lower GI bleed. She is at risk for varices, but has also never had a colonoscopy.   We will prep her today for an EGD and Colonoscopy tomorrow.   See diet orders.   Continue to monitor H/H and transfuse as indicated.

## 2019-02-19 NOTE — ASSESSMENT & PLAN NOTE
Continue patient home meds  Patient has been on losartan and Norvasc states that losartan was changed to lisinopril today.  Trans elevated in the emergency room noted. Will start lisinopril tonight.  Clonidine p.r.n.   Monitor trends and adjustments as needed optimization pending course.  Patient should keep outpatient follow-up as recommended  Further evaluation/diagnostics/interventions/consults pending course

## 2019-02-19 NOTE — ASSESSMENT & PLAN NOTE
LFTs stable check alcohol level.  Monitor MELD labs  INR negative.  Consider GI consult pending course  Was started on IV fluids in ER patient's vital signs hypertensive.  Will hold additional IV fluids at this time, monitor close.  Further evaluation/diagnostics/interventions/consults pending course       MELD-Na score: 10 at 2/18/2019  5:25 PM  MELD score: 10 at 2/18/2019  5:25 PM  Calculated from:  Serum Creatinine: 1.4 mg/dL at 2/18/2019  5:25 PM  Serum Sodium: 142 mmol/L (Rounded to 137 mmol/L) at 2/18/2019  5:25 PM  Total Bilirubin: 0.5 mg/dL (Rounded to 1 mg/dL) at 2/18/2019  5:25 PM  INR(ratio): 1 at 2/18/2019  5:25 PM  Age: 63 years

## 2019-02-19 NOTE — HOSPITAL COURSE
The pt is a 62 yo female with Hep C Cirrhosis, HCC, Gastric lymphoma, HTN, Dm who was placed in observation for GI bleed. Hgb 11.8>10.7>10.2>11.4>9.4. (Last Hgb 1/21/19 was 13.6). No further bloody or dark stools.   GI evaluated pt and recommended EGD/Colonoscopy which showed Medium-sized hiatal hernia, Non-bleeding external and internal hemorrhoids, poor prep. No source of melena found.   H/H remained stable. Pt will follow up with GI.

## 2019-02-19 NOTE — PROGRESS NOTES
[facility] Hospital Medicine  Progress Note    Patient Name: Chani Aviles  MRN: [id number]  Patient Class: OP- Observation   Admission Date: 2/18/2019  Length of Stay: 0 days  Attending Physician: Malik Martinez MD  Primary Care Provider: Primary Doctor No        Subjective:     Principal Problem:GI bleed    HPI:  Chani Aviles is a 63 y.o. female   with history including hep C, hepatocellular carcinoma, esophageal varices, H pylori infection last year, presents for melena which onset gradually 4 days ago. Symptoms are episodic . No mitigating or exacerbating factors reported. Associated sxs include rectal pain and diffuse abd pain. No prior treatment.  No further complaints or concerns at this time.   patient was evaluated in the emergency room H&H 11.8 and 34.8, BUN 29.  To note patient was recently seen at [facility] February 12, 2019 for chest pain workup, patient denies any coronary complaints at this time.  See Care everywhere.  Hospital Medicine was consulted patient placed in observation.        Hospital Course:  The pt is a 62 yo female with Hep C Cirrhosis, HCC, Gastric lymphoma, HTN, Dm who was placed in observation for GI bleed. Hgb 11.8>10.7>10.2>11.4. (Last Hgb 1/21/19 was 13.6). No further bloody or dark stools.   GI evaluated pt and recommended EGD/Colonoscopy in am.         Interval History: Denies abdominal pain, no further blood in stool today   Review of Systems   Constitutional: Negative for appetite change, chills, diaphoresis, fatigue, fever and unexpected weight change.   HENT: Negative for congestion, nosebleeds, sinus pressure and sore throat.    Eyes: Negative for pain, discharge and visual disturbance.   Respiratory: Negative for cough, chest tightness, shortness of breath, wheezing and stridor.    Cardiovascular: Negative for chest pain, palpitations and leg swelling.   Gastrointestinal: Positive for blood in stool. Negative for abdominal distention, abdominal  pain, constipation, diarrhea, nausea and vomiting.   Endocrine: Negative for cold intolerance and heat intolerance.   Genitourinary: Negative for difficulty urinating, dysuria, flank pain, frequency and urgency.   Musculoskeletal: Negative for arthralgias, back pain, joint swelling, myalgias, neck pain and neck stiffness.   Skin: Negative for rash and wound.   Allergic/Immunologic: Negative for food allergies and immunocompromised state.   Neurological: Negative for dizziness, seizures, syncope, facial asymmetry, speech difficulty, weakness, light-headedness, numbness and headaches.   Hematological: Negative for adenopathy.   Psychiatric/Behavioral: Negative for agitation, confusion and hallucinations.     Objective:     Vital Signs (Most Recent):  Temp: 98.1 °F (36.7 °C) (02/19/19 1226)  Pulse: 83 (02/19/19 1226)  Resp: 20 (02/19/19 1226)  BP: (!) 144/71 (02/19/19 1226)  SpO2: 96 % (02/19/19 1226) Vital Signs (24h Range):  Temp:  [97.9 °F (36.6 °C)-98.5 °F (36.9 °C)] 98.1 °F (36.7 °C)  Pulse:  [75-85] 83  Resp:  [14-20] 20  SpO2:  [96 %-100 %] 96 %  BP: (124-176)/(61-90) 144/71     Weight: 69 kg (152 lb 1.9 oz)  Body mass index is 24.55 kg/m².    Intake/Output Summary (Last 24 hours) at 2/19/2019 1431  Last data filed at 2/19/2019 0900  Gross per 24 hour   Intake 350 ml   Output --   Net 350 ml      Physical Exam   Constitutional: She is oriented to person, place, and time. She appears well-developed and well-nourished. No distress.   HENT:   Head: Normocephalic and atraumatic.   Nose: Nose normal.   Mouth/Throat: Oropharynx is clear and moist.   Eyes: Conjunctivae and EOM are normal. No scleral icterus.   Neck: Normal range of motion. Neck supple. No tracheal deviation present.   Cardiovascular: Normal rate, regular rhythm, normal heart sounds and intact distal pulses. Exam reveals no gallop and no friction rub.   No murmur heard.  Pulmonary/Chest: Effort normal and breath sounds normal. No stridor. No respiratory  distress. She has no wheezes. She has no rales. She exhibits no tenderness.   Abdominal: Soft. Bowel sounds are normal. She exhibits no distension and no mass. There is no tenderness. There is no rebound and no guarding.   Musculoskeletal: Normal range of motion. She exhibits no edema, tenderness or deformity.   Neurological: She is alert and oriented to person, place, and time. No cranial nerve deficit. She exhibits normal muscle tone. Coordination normal.   Skin: Skin is warm and dry. No rash noted. She is not diaphoretic. No erythema. No pallor.   Psychiatric: She has a normal mood and affect. Her behavior is normal. Thought content normal.   Nursing note and vitals reviewed.      Significant Labs:   BMP:   Recent Labs   Lab 02/18/19  1725 02/19/19  0523   * 106    138   K 4.6 4.1    109   CO2 27 25   BUN 29* 28*   CREATININE 1.4 1.2   CALCIUM 8.7 7.9*   MG 2.2  --      CBC:   Recent Labs   Lab 02/18/19  1725 02/19/19  0045 02/19/19  0523 02/19/19  1125   WBC 5.59  --  4.71  --    HGB 11.8* 10.7* 10.2*  10.2* 10.4*   HCT 34.8* 31.6* 30.7*  30.7* 31.4*   PLT 98*  --  89*  --      CMP:   Recent Labs   Lab 02/18/19  1725 02/19/19  0523    138   K 4.6 4.1    109   CO2 27 25   * 106   BUN 29* 28*   CREATININE 1.4 1.2   CALCIUM 8.7 7.9*   PROT 6.6 5.3*   ALBUMIN 2.3* 2.0*   BILITOT 0.5 0.9   ALKPHOS 312* 224*   AST 82* 71*   ALT 40 36   ANIONGAP 7* 4*   EGFRNONAA 40* 48*     All pertinent labs within the past 24 hours have been reviewed.    Significant Imaging:  Imaging Results    None       Assessment/Plan:      * GI bleed    Cont Protonix  Monitor H/H   EGD and colonoscopy in am        Uncontrolled diabetes mellitus type 2 without complications    NISS       Obstructive airway disease    Stable no respiratory complaints  Continue Atrovent.     Anemia due to blood loss, acute    Monitor H/H  Transfuse prn      Essential hypertension    Cont Lasix and Spironolactone  Hold  Amlodipine and Losartan for now        HCC (hepatocellular carcinoma)    F/U with hepatology        Compensated HCV cirrhosis    Monitor MELD labs  GI following   Cont Spironolactone and Lasix       MELD-Na score: 9 at 2/19/2019 11:25 AM  MELD score: 9 at 2/19/2019 11:25 AM  Calculated from:  Serum Creatinine: 1.2 mg/dL at 2/19/2019  5:23 AM  Serum Sodium: 138 mmol/L (Rounded to 137 mmol/L) at 2/19/2019  5:23 AM  Total Bilirubin: 0.9 mg/dL (Rounded to 1 mg/dL) at 2/19/2019  5:23 AM  INR(ratio): 1.1 at 2/19/2019 11:25 AM  Age: 63 years         VTE Risk Mitigation (From admission, onward)        Ordered     IP VTE HIGH RISK PATIENT  Once      02/18/19 1934     Place sequential compression device  Until discontinued      02/18/19 1934     Place CAROL hose  Until discontinued      02/18/19 1934              Rama Peres NP  Department of Hospital Medicine   [facility]

## 2019-02-19 NOTE — SUBJECTIVE & OBJECTIVE
Past Medical History:   Diagnosis Date    Compensated HCV cirrhosis 12/7/2018    Constipation 12/7/2018    Essential hypertension 7/17/2015    Last Assessment & Plan:  Not @ Goal. No b/p log brought to clinic as advised. Will increase Losartan to 100 mg daily and + Norvasc 10 mg daily. 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 No b/s log brought to clinic visit       History reviewed. No pertinent surgical history.    Review of patient's allergies indicates:  No Known Allergies  Family History     None        Tobacco Use    Smoking status: Light Tobacco Smoker     Types: Cigarettes    Smokeless tobacco: Never Used   Substance and Sexual Activity    Alcohol use: No     Frequency: Never     Binge frequency: Never    Drug use: No    Sexual activity: No     Review of Systems   Constitutional: Negative for fever.   HENT: Negative for hearing loss.    Eyes: Negative for visual disturbance.   Respiratory: Negative for cough and shortness of breath.    Cardiovascular: Negative for chest pain.   Gastrointestinal:        As per HPI.   Genitourinary: Negative for dysuria, frequency and hematuria.   Musculoskeletal: Negative for arthralgias and back pain.   Skin: Negative for rash.   Neurological: Negative for seizures, syncope, numbness and headaches.   Hematological: Does not bruise/bleed easily.   Psychiatric/Behavioral: The patient is not nervous/anxious.      Objective:     Vital Signs (Most Recent):  Temp: 98.3 °F (36.8 °C) (02/19/19 0804)  Pulse: 85 (02/19/19 0804)  Resp: 16 (02/19/19 0804)  BP: (!) 124/90 (02/19/19 0804)  SpO2: 97 % (02/19/19 0804) Vital Signs (24h Range):  Temp:  [97.9 °F (36.6 °C)-98.5 °F (36.9 °C)]  98.3 °F (36.8 °C)  Pulse:  [75-85] 85  Resp:  [14-18] 16  SpO2:  [96 %-100 %] 97 %  BP: (124-176)/(61-90) 124/90     Weight: 69 kg (152 lb 1.9 oz) (02/18/19 1611)  Body mass index is 24.55 kg/m².      Intake/Output Summary (Last 24 hours) at 2/19/2019 1112  Last data filed at 2/19/2019 0900  Gross per 24 hour   Intake 350 ml   Output --   Net 350 ml       Lines/Drains/Airways     Peripheral Intravenous Line                 Peripheral IV - Single Lumen 02/18/19 1733 Right Hand less than 1 day                Physical Exam   Constitutional: She is oriented to person, place, and time. She appears well-developed and well-nourished.   HENT:   Head: Normocephalic and atraumatic.   Eyes: EOM are normal.   Neck: Normal range of motion. Neck supple. Carotid bruit is not present.   Cardiovascular: Normal rate and regular rhythm.   No murmur heard.  Pulmonary/Chest: Effort normal and breath sounds normal. No respiratory distress. She has no wheezes.   Abdominal: Soft. Normal appearance and bowel sounds are normal. She exhibits no distension and no mass. There is tenderness in the right lower quadrant.   RECTAL: No external hemorrhoids or fissure; however, there is tenderness on exam. Normal sphincter tone. No internal palpable mass. No gross blood or stool on exam.    Musculoskeletal: She exhibits no edema.   Neurological: She is alert and oriented to person, place, and time. No cranial nerve deficit.   Skin: Skin is warm and dry. No rash noted.   Psychiatric: Her behavior is normal.       Significant Labs:  CBC:   Recent Labs   Lab 02/18/19  1725 02/19/19  0045 02/19/19  0523   WBC 5.59  --  4.71   HGB 11.8* 10.7* 10.2*  10.2*   HCT 34.8* 31.6* 30.7*  30.7*   PLT 98*  --  89*     CMP:   Recent Labs   Lab 02/19/19  0523      CALCIUM 7.9*   ALBUMIN 2.0*   PROT 5.3*      K 4.1   CO2 25      BUN 28*   CREATININE 1.2   ALKPHOS 224*   ALT 36   AST 71*   BILITOT 0.9     Coagulation:   Recent Labs   Lab  02/18/19  1725   INR 1.0   APTT 26.9       Significant Imaging:  Imaging results within the past 24 hours have been reviewed.

## 2019-02-19 NOTE — ASSESSMENT & PLAN NOTE
Monitor MELD labs  GI following   Cont Spironolactone and Lasix       MELD-Na score: 9 at 2/19/2019 11:25 AM  MELD score: 9 at 2/19/2019 11:25 AM  Calculated from:  Serum Creatinine: 1.2 mg/dL at 2/19/2019  5:23 AM  Serum Sodium: 138 mmol/L (Rounded to 137 mmol/L) at 2/19/2019  5:23 AM  Total Bilirubin: 0.9 mg/dL (Rounded to 1 mg/dL) at 2/19/2019  5:23 AM  INR(ratio): 1.1 at 2/19/2019 11:25 AM  Age: 63 years

## 2019-02-19 NOTE — NURSING
Patient had moderate amount bowel movement. There was lala blood present, small amount on the stool.

## 2019-02-19 NOTE — SUBJECTIVE & OBJECTIVE
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    Review of patient's allergies indicates:  No Known Allergies    No current facility-administered medications on file prior to encounter.      Current Outpatient Medications on File Prior to Encounter   Medication Sig    amLODIPine (NORVASC) 10 MG tablet amlodipine 10 mg tablet    amLODIPine (NORVASC) 10 MG tablet Take 10 mg by mouth nightly.    furosemide (LASIX) 20 MG tablet Take 20 mg by mouth once daily.    gabapentin (NEURONTIN) 300 MG capsule Take 1 capsule (300 mg total) by mouth 3 (three) times daily.    insulin lispro protamin-lispro (HUMALOG MIX 75-25 KWIKPEN) 100 unit/mL (75-25) InPn 25 UNITS SUBCUTANEOUSLY IN AM AND 15 UNITS AT NIGHT    lisinopril 10 MG tablet Take 10 mg by mouth every morning.    losartan (COZAAR) 100 MG tablet Take 100 mg by mouth once daily.    HYDROcodone-acetaminophen (NORCO) 5-325 mg per tablet Take 1 tablet by mouth every 4 (four) hours as needed. for pain.    ibandronate (BONIVA) 150 mg tablet ibandronate 150 mg tablet    iron bis-gly-FA-E-O33-Bg-succ 150 mg iron(21) -400 mcg (7) Tab Take 1 tablet by mouth.    lactulose (CHRONULAC) 20 gram/30 mL Soln Take 20 g by mouth.    multivitamin (ONE DAILY MULTIVITAMIN) per tablet Take 1 tablet by mouth.    spironolactone (ALDACTONE) 25 MG tablet Take 25 mg by mouth 2 (two) times daily.    tiotropium (SPIRIVA WITH HANDIHALER) 18 mcg inhalation capsule Spiriva with HandiHaler 18 mcg and inhalation capsules    tiotropium bromide (SPIRIVA RESPIMAT) 2.5 mcg/actuation Mist Spiriva Respimat 2.5 mcg/actuation solution for inhalation     Family History     None        Tobacco Use    Smoking status: Light Tobacco Smoker     Types: Cigarettes    Smokeless tobacco: Never Used   Substance and Sexual Activity     Alcohol use: No     Frequency: Never     Binge frequency: Never    Drug use: No    Sexual activity: No     Review of Systems   Constitutional: Negative for chills, diaphoresis, fatigue and fever.   HENT: Negative for congestion, sore throat and voice change.    Eyes: Negative for photophobia and visual disturbance.   Respiratory: Negative for cough, shortness of breath, wheezing and stridor.    Cardiovascular: Negative for chest pain and leg swelling.   Gastrointestinal: Positive for abdominal pain, blood in stool and rectal pain. Negative for abdominal distention, constipation, diarrhea, nausea and vomiting.   Endocrine: Negative for polydipsia, polyphagia and polyuria.   Genitourinary: Negative for difficulty urinating, dysuria, flank pain, pelvic pain, urgency and vaginal discharge.   Musculoskeletal: Negative for back pain, joint swelling, neck pain and neck stiffness.   Skin: Negative for color change and rash.   Allergic/Immunologic: Negative for immunocompromised state.   Neurological: Negative for dizziness, syncope, weakness, numbness and headaches.   Hematological: Does not bruise/bleed easily.   Psychiatric/Behavioral: Negative for agitation, behavioral problems and confusion.     Objective:     Vital Signs (Most Recent):  Temp: 97.9 °F (36.6 °C) (02/18/19 1941)  Pulse: 80 (02/18/19 1941)  Resp: 15 (02/18/19 1941)  BP: (!) 175/84 (02/18/19 1941)  SpO2: 97 % (02/18/19 1941) Vital Signs (24h Range):  Temp:  [97.9 °F (36.6 °C)-98 °F (36.7 °C)] 97.9 °F (36.6 °C)  Pulse:  [79-83] 80  Resp:  [15-18] 15  SpO2:  [97 %-100 %] 97 %  BP: (143-176)/(79-84) 175/84     Weight: 69 kg (152 lb 1.9 oz)  Body mass index is 24.55 kg/m².    Physical Exam   Constitutional: She is oriented to person, place, and time. She appears well-developed and well-nourished. No distress.   HENT:   Head: Normocephalic and atraumatic.   Nose: Nose normal.   Eyes: Conjunctivae and EOM are normal. Pupils are equal, round, and reactive to  light. No scleral icterus.   Neck: Normal range of motion. Neck supple. No tracheal deviation present.   Cardiovascular: Normal rate, regular rhythm, normal heart sounds and intact distal pulses.   No murmur heard.  Pulmonary/Chest: Effort normal and breath sounds normal. No stridor. No respiratory distress. She has no wheezes. She has no rales.   Abdominal: Soft. Bowel sounds are normal. She exhibits distension ( Mild ascites). There is tenderness ( And generalized but worse to right upper and lower). There is no guarding.   Genitourinary: Rectal exam shows guaiac negative stool.   Genitourinary Comments: Check occult stool      Musculoskeletal: Normal range of motion. She exhibits no edema or deformity.   Neurological: She is alert and oriented to person, place, and time. No cranial nerve deficit.   Skin: Skin is warm and dry. Capillary refill takes less than 2 seconds. No rash noted. She is not diaphoretic.   Psychiatric: She has a normal mood and affect. Her behavior is normal. Judgment and thought content normal.   Nursing note and vitals reviewed.        CRANIAL NERVES     CN III, IV, VI   Pupils are equal, round, and reactive to light.  Extraocular motions are normal.        Significant Labs: All pertinent labs within the past 24 hours have been reviewed.  Results for orders placed or performed during the hospital encounter of 02/18/19   CBC auto differential   Result Value Ref Range    WBC 5.59 3.90 - 12.70 K/uL    RBC 3.66 (L) 4.00 - 5.40 M/uL    Hemoglobin 11.8 (L) 12.0 - 16.0 g/dL    Hematocrit 34.8 (L) 37.0 - 48.5 %    MCV 95 82 - 98 fL    MCH 32.2 (H) 27.0 - 31.0 pg    MCHC 33.9 32.0 - 36.0 g/dL    RDW 14.1 11.5 - 14.5 %    Platelets 98 (L) 150 - 350 K/uL    MPV 12.6 9.2 - 12.9 fL    Gran # (ANC) 3.5 1.8 - 7.7 K/uL    Lymph # 1.5 1.0 - 4.8 K/uL    Mono # 0.5 0.3 - 1.0 K/uL    Eos # 0.1 0.0 - 0.5 K/uL    Baso # 0.03 0.00 - 0.20 K/uL    Gran% 61.8 38.0 - 73.0 %    Lymph% 27.0 18.0 - 48.0 %    Mono% 8.9  4.0 - 15.0 %    Eosinophil% 1.8 0.0 - 8.0 %    Basophil% 0.5 0.0 - 1.9 %    Differential Method Automated    Comprehensive metabolic panel   Result Value Ref Range    Sodium 142 136 - 145 mmol/L    Potassium 4.6 3.5 - 5.1 mmol/L    Chloride 108 95 - 110 mmol/L    CO2 27 23 - 29 mmol/L    Glucose 123 (H) 70 - 110 mg/dL    BUN, Bld 29 (H) 8 - 23 mg/dL    Creatinine 1.4 0.5 - 1.4 mg/dL    Calcium 8.7 8.7 - 10.5 mg/dL    Total Protein 6.6 6.0 - 8.4 g/dL    Albumin 2.3 (L) 3.5 - 5.2 g/dL    Total Bilirubin 0.5 0.1 - 1.0 mg/dL    Alkaline Phosphatase 312 (H) 55 - 135 U/L    AST 82 (H) 10 - 40 U/L    ALT 40 10 - 44 U/L    Anion Gap 7 (L) 8 - 16 mmol/L    eGFR if African American 46 (A) >60 mL/min/1.73 m^2    eGFR if non African American 40 (A) >60 mL/min/1.73 m^2   Urinalysis, Reflex to Urine Culture Urine, Clean Catch   Result Value Ref Range    Specimen UA Urine, Clean Catch     Color, UA Yellow Yellow, Straw, Gely    Appearance, UA Clear Clear    pH, UA 6.0 5.0 - 8.0    Specific Gravity, UA 1.025 1.005 - 1.030    Protein, UA 2+ (A) Negative    Glucose, UA Negative Negative    Ketones, UA Negative Negative    Bilirubin (UA) Negative Negative    Occult Blood UA Trace (A) Negative    Nitrite, UA Negative Negative    Urobilinogen, UA Negative <2.0 EU/dL    Leukocytes, UA Negative Negative   Protime-INR   Result Value Ref Range    Prothrombin Time 10.9 9.0 - 12.5 sec    INR 1.0 0.8 - 1.2   APTT   Result Value Ref Range    aPTT 26.9 21.0 - 32.0 sec   Urinalysis Microscopic   Result Value Ref Range    RBC, UA 2 0 - 4 /hpf    WBC, UA 4 0 - 5 /hpf    Bacteria, UA None None-Occ /hpf    Yeast, UA Rare (A) None    Squam Epithel, UA 2 /hpf    Hyaline Casts, UA 0 0-1/lpf /lpf    Microscopic Comment SEE COMMENT    Magnesium   Result Value Ref Range    Magnesium 2.2 1.6 - 2.6 mg/dL   Phosphorus   Result Value Ref Range    Phosphorus 3.1 2.7 - 4.5 mg/dL   Ethanol   Result Value Ref Range    Alcohol, Medical, Serum <10 <10 mg/dL   Type  & Screen   Result Value Ref Range    Group & Rh B POS     Indirect Albino NEG    POCT glucose   Result Value Ref Range    POCT Glucose 109 70 - 110 mg/dL       Significant Imaging: I have reviewed all pertinent imaging results/findings within the past 24 hours.   Imaging Results    Chest and abdomen x-ray pending

## 2019-02-19 NOTE — ASSESSMENT & PLAN NOTE
Check INR and CMP daily.     MELD-Na score: 8 at 2/19/2019  5:23 AM  MELD score: 8 at 2/19/2019  5:23 AM  Calculated from:  Serum Creatinine: 1.2 mg/dL at 2/19/2019  5:23 AM  Serum Sodium: 138 mmol/L (Rounded to 137 mmol/L) at 2/19/2019  5:23 AM  Total Bilirubin: 0.9 mg/dL (Rounded to 1 mg/dL) at 2/19/2019  5:23 AM  INR(ratio): 1 at 2/18/2019  5:25 PM  Age: 63 years

## 2019-02-19 NOTE — ASSESSMENT & PLAN NOTE
Related to chronic bleeding.  Check iron level.  Consider further anemia workup pending course.  Hold iron tab for now and monitor for resolution in symptoms  Further evaluation/diagnostics/interventions/consults pending course

## 2019-02-19 NOTE — PLAN OF CARE
Problem: Adult Inpatient Plan of Care  Goal: Plan of Care Review  Outcome: Ongoing (interventions implemented as appropriate)  Pt denies pain at this time. Occult stool positive. No acute distress noted. Blood glucose is being monitored. IV Protonix given per order. No injuries. Will continue to monitor. 12 hour chart check is completed.

## 2019-02-19 NOTE — HPI
Chani Aviles is a 63 y.o. female  with history including hep C, hepatocellular carcinoma, esophageal varices, H pylori infection last year, presents for melena which onset gradually 4 days ago. Symptoms are episodic. No mitigating or exacerbating factors reported. Associated sxs include rectal pain and diffuse abd pain. No prior treatment.  No further complaints or concerns at this time.  patient was evaluated in the emergency room H&H 11.8 and 34.8, BUN 29.  To note patient was recently seen at [facility] February 12, 2019 for chest pain workup, patient denies any coronary complaints at this time.  See Care everywhere.  Hospital Medicine was consulted patient placed in observation.

## 2019-02-20 ENCOUNTER — ANESTHESIA EVENT (OUTPATIENT)
Dept: ENDOSCOPY | Facility: HOSPITAL | Age: 64
End: 2019-02-20
Payer: MEDICAID

## 2019-02-20 ENCOUNTER — ANESTHESIA (OUTPATIENT)
Dept: ENDOSCOPY | Facility: HOSPITAL | Age: 64
End: 2019-02-20
Payer: MEDICAID

## 2019-02-20 VITALS
OXYGEN SATURATION: 100 % | RESPIRATION RATE: 20 BRPM | DIASTOLIC BLOOD PRESSURE: 71 MMHG | BODY MASS INDEX: 24.45 KG/M2 | TEMPERATURE: 98 F | HEART RATE: 75 BPM | SYSTOLIC BLOOD PRESSURE: 157 MMHG | WEIGHT: 152.13 LBS | HEIGHT: 66 IN

## 2019-02-20 PROBLEM — K64.8 INTERNAL HEMORRHOIDS: Status: ACTIVE | Noted: 2019-02-20

## 2019-02-20 LAB
ALBUMIN SERPL BCP-MCNC: 1.9 G/DL
ALP SERPL-CCNC: 171 U/L
ALT SERPL W/O P-5'-P-CCNC: 35 U/L
ANION GAP SERPL CALC-SCNC: 7 MMOL/L
AST SERPL-CCNC: 74 U/L
BASOPHILS # BLD AUTO: 0.03 K/UL
BASOPHILS NFR BLD: 0.7 %
BILIRUB SERPL-MCNC: 0.8 MG/DL
BUN SERPL-MCNC: 23 MG/DL
CALCIUM SERPL-MCNC: 7.7 MG/DL
CHLORIDE SERPL-SCNC: 109 MMOL/L
CO2 SERPL-SCNC: 23 MMOL/L
CREAT SERPL-MCNC: 1.3 MG/DL
DIFFERENTIAL METHOD: ABNORMAL
EOSINOPHIL # BLD AUTO: 0.1 K/UL
EOSINOPHIL NFR BLD: 2.1 %
ERYTHROCYTE [DISTWIDTH] IN BLOOD BY AUTOMATED COUNT: 14.4 %
EST. GFR  (AFRICAN AMERICAN): 50 ML/MIN/1.73 M^2
EST. GFR  (NON AFRICAN AMERICAN): 43 ML/MIN/1.73 M^2
GLUCOSE SERPL-MCNC: 116 MG/DL
HCT VFR BLD AUTO: 27.7 %
HGB BLD-MCNC: 9.4 G/DL
INR PPP: 1.1
LYMPHOCYTES # BLD AUTO: 1.4 K/UL
LYMPHOCYTES NFR BLD: 34 %
MCH RBC QN AUTO: 32.2 PG
MCHC RBC AUTO-ENTMCNC: 33.9 G/DL
MCV RBC AUTO: 95 FL
MONOCYTES # BLD AUTO: 0.4 K/UL
MONOCYTES NFR BLD: 9.5 %
NEUTROPHILS # BLD AUTO: 2.3 K/UL
NEUTROPHILS NFR BLD: 53.7 %
PLATELET # BLD AUTO: 88 K/UL
PMV BLD AUTO: 12.3 FL
POCT GLUCOSE: 116 MG/DL (ref 70–110)
POCT GLUCOSE: 99 MG/DL (ref 70–110)
POTASSIUM SERPL-SCNC: 3.8 MMOL/L
PROT SERPL-MCNC: 5.3 G/DL
PROTHROMBIN TIME: 11.5 SEC
RBC # BLD AUTO: 2.92 M/UL
SODIUM SERPL-SCNC: 139 MMOL/L
WBC # BLD AUTO: 4.23 K/UL

## 2019-02-20 PROCEDURE — 88342 IMHCHEM/IMCYTCHM 1ST ANTB: CPT | Mod: 26,,, | Performed by: PATHOLOGY

## 2019-02-20 PROCEDURE — 36415 COLL VENOUS BLD VENIPUNCTURE: CPT

## 2019-02-20 PROCEDURE — 80053 COMPREHEN METABOLIC PANEL: CPT

## 2019-02-20 PROCEDURE — 85025 COMPLETE CBC W/AUTO DIFF WBC: CPT

## 2019-02-20 PROCEDURE — 00813 ANES UPR LWR GI NDSC PX: CPT | Performed by: INTERNAL MEDICINE

## 2019-02-20 PROCEDURE — G0378 HOSPITAL OBSERVATION PER HR: HCPCS

## 2019-02-20 PROCEDURE — 27201012 HC FORCEPS, HOT/COLD, DISP: Performed by: INTERNAL MEDICINE

## 2019-02-20 PROCEDURE — 88305 TISSUE EXAM BY PATHOLOGIST: CPT | Performed by: PATHOLOGY

## 2019-02-20 PROCEDURE — 25000003 PHARM REV CODE 250: Performed by: NURSE ANESTHETIST, CERTIFIED REGISTERED

## 2019-02-20 PROCEDURE — 94640 AIRWAY INHALATION TREATMENT: CPT | Mod: 59

## 2019-02-20 PROCEDURE — 85610 PROTHROMBIN TIME: CPT

## 2019-02-20 PROCEDURE — 37000009 HC ANESTHESIA EA ADD 15 MINS: Performed by: INTERNAL MEDICINE

## 2019-02-20 PROCEDURE — 43239 PR EGD, FLEX, W/BIOPSY, SGL/MULTI: ICD-10-PCS | Mod: 51,,, | Performed by: INTERNAL MEDICINE

## 2019-02-20 PROCEDURE — 96376 TX/PRO/DX INJ SAME DRUG ADON: CPT | Mod: 59 | Performed by: EMERGENCY MEDICINE

## 2019-02-20 PROCEDURE — 88305 TISSUE SPECIMEN TO PATHOLOGY - SURGERY: ICD-10-PCS | Mod: 26,,, | Performed by: PATHOLOGY

## 2019-02-20 PROCEDURE — 88305 TISSUE EXAM BY PATHOLOGIST: CPT | Mod: 26,,, | Performed by: PATHOLOGY

## 2019-02-20 PROCEDURE — 43239 EGD BIOPSY SINGLE/MULTIPLE: CPT | Mod: 51,,, | Performed by: INTERNAL MEDICINE

## 2019-02-20 PROCEDURE — 43239 EGD BIOPSY SINGLE/MULTIPLE: CPT | Performed by: INTERNAL MEDICINE

## 2019-02-20 PROCEDURE — 25000242 PHARM REV CODE 250 ALT 637 W/ HCPCS: Performed by: NURSE PRACTITIONER

## 2019-02-20 PROCEDURE — C9113 INJ PANTOPRAZOLE SODIUM, VIA: HCPCS | Performed by: NURSE PRACTITIONER

## 2019-02-20 PROCEDURE — 25000003 PHARM REV CODE 250: Performed by: INTERNAL MEDICINE

## 2019-02-20 PROCEDURE — 45378 DIAGNOSTIC COLONOSCOPY: CPT | Performed by: INTERNAL MEDICINE

## 2019-02-20 PROCEDURE — 37000008 HC ANESTHESIA 1ST 15 MINUTES: Performed by: INTERNAL MEDICINE

## 2019-02-20 PROCEDURE — 88342 TISSUE SPECIMEN TO PATHOLOGY - SURGERY: ICD-10-PCS | Mod: 26,,, | Performed by: PATHOLOGY

## 2019-02-20 PROCEDURE — 63600175 PHARM REV CODE 636 W HCPCS: Performed by: NURSE ANESTHETIST, CERTIFIED REGISTERED

## 2019-02-20 PROCEDURE — 25000003 PHARM REV CODE 250: Performed by: NURSE PRACTITIONER

## 2019-02-20 PROCEDURE — 63600175 PHARM REV CODE 636 W HCPCS: Performed by: NURSE PRACTITIONER

## 2019-02-20 PROCEDURE — 45378 DIAGNOSTIC COLONOSCOPY: CPT | Mod: ,,, | Performed by: INTERNAL MEDICINE

## 2019-02-20 PROCEDURE — 45378 PR COLONOSCOPY,DIAGNOSTIC: ICD-10-PCS | Mod: ,,, | Performed by: INTERNAL MEDICINE

## 2019-02-20 RX ORDER — SODIUM CHLORIDE, SODIUM LACTATE, POTASSIUM CHLORIDE, CALCIUM CHLORIDE 600; 310; 30; 20 MG/100ML; MG/100ML; MG/100ML; MG/100ML
INJECTION, SOLUTION INTRAVENOUS CONTINUOUS
Status: DISCONTINUED | OUTPATIENT
Start: 2019-02-20 | End: 2019-02-20

## 2019-02-20 RX ORDER — PROPOFOL 10 MG/ML
VIAL (ML) INTRAVENOUS
Status: DISCONTINUED | OUTPATIENT
Start: 2019-02-20 | End: 2019-02-20

## 2019-02-20 RX ORDER — PANTOPRAZOLE SODIUM 40 MG/1
40 TABLET, DELAYED RELEASE ORAL DAILY
Status: DISCONTINUED | OUTPATIENT
Start: 2019-02-20 | End: 2019-02-20 | Stop reason: HOSPADM

## 2019-02-20 RX ORDER — PANTOPRAZOLE SODIUM 40 MG/1
40 TABLET, DELAYED RELEASE ORAL DAILY
Qty: 30 TABLET | Refills: 0 | Status: SHIPPED | OUTPATIENT
Start: 2019-02-20 | End: 2019-03-27

## 2019-02-20 RX ORDER — LIDOCAINE HYDROCHLORIDE 10 MG/ML
INJECTION INFILTRATION; PERINEURAL
Status: DISCONTINUED | OUTPATIENT
Start: 2019-02-20 | End: 2019-02-20

## 2019-02-20 RX ADMIN — PANTOPRAZOLE SODIUM 40 MG: 40 INJECTION, POWDER, LYOPHILIZED, FOR SOLUTION INTRAVENOUS at 09:02

## 2019-02-20 RX ADMIN — SPIRONOLACTONE 25 MG: 25 TABLET ORAL at 09:02

## 2019-02-20 RX ADMIN — PROPOFOL 50 MG: 10 INJECTION, EMULSION INTRAVENOUS at 01:02

## 2019-02-20 RX ADMIN — GABAPENTIN 100 MG: 100 CAPSULE ORAL at 02:02

## 2019-02-20 RX ADMIN — IPRATROPIUM BROMIDE 0.5 MG: 0.5 SOLUTION RESPIRATORY (INHALATION) at 07:02

## 2019-02-20 RX ADMIN — LIDOCAINE HYDROCHLORIDE 50 MG: 10 INJECTION, SOLUTION INFILTRATION; PERINEURAL at 01:02

## 2019-02-20 RX ADMIN — PROPOFOL 100 MG: 10 INJECTION, EMULSION INTRAVENOUS at 01:02

## 2019-02-20 RX ADMIN — SODIUM CHLORIDE, SODIUM LACTATE, POTASSIUM CHLORIDE, AND CALCIUM CHLORIDE: 600; 310; 30; 20 INJECTION, SOLUTION INTRAVENOUS at 01:02

## 2019-02-20 NOTE — ANESTHESIA RELEASE NOTE
"Anesthesia Release from PACU Note    Patient: Chani Aviles    Procedure(s) Performed: Procedure(s) (LRB):  EGD (ESOPHAGOGASTRODUODENOSCOPY) (Left)  COLONOSCOPY (N/A)    Anesthesia type: MAC    Post pain: Adequate analgesia    Post assessment: no apparent anesthetic complications, tolerated procedure well and no evidence of recall    Last Vitals:   Visit Vitals  BP (!) 157/71 (BP Location: Left arm, Patient Position: Lying)   Pulse 75   Temp 36.7 °C (98.1 °F)   Resp 20   Ht 5' 6" (1.676 m)   Wt 69 kg (152 lb 1.9 oz)   SpO2 100%   Breastfeeding? No   BMI 24.55 kg/m²       Post vital signs: stable    Level of consciousness: awake, alert  and oriented    Nausea/Vomiting: no nausea/no vomiting    Complications: none    Airway Patency: patent    Respiratory: unassisted, spontaneous ventilation, room air    Cardiovascular: stable and blood pressure at baseline    Hydration: euvolemic  "

## 2019-02-20 NOTE — PLAN OF CARE
Pt transferred back to unit per stretcher. MANNY JEREZ. Family at bedside. Report handouts given to pt and family.     Bedside report given to Jaciel

## 2019-02-20 NOTE — DISCHARGE SUMMARY
[facility] Hospital Medicine  Discharge Summary      Patient Name: Chani Aviles  MRN: [id number]  Admission Date: 2/18/2019  Hospital Length of Stay: 0 days  Discharge Date and Time:  02/20/2019 3:48 PM  Attending Physician: Malik Martinez MD   Discharging Provider: Mare Urbina NP  Primary Care Provider: Primary Doctor No      HPI:   Chani Aviles is a 63 y.o. female   with history including hep C, hepatocellular carcinoma, esophageal varices, H pylori infection last year, presents for melena which onset gradually 4 days ago. Symptoms are episodic . No mitigating or exacerbating factors reported. Associated sxs include rectal pain and diffuse abd pain. No prior treatment.  No further complaints or concerns at this time.   patient was evaluated in the emergency room H&H 11.8 and 34.8, BUN 29.  To note patient was recently seen at [facility] February 12, 2019 for chest pain workup, patient denies any coronary complaints at this time.  See Care everywhere.  Hospital Medicine was consulted patient placed in observation.        Procedure(s) (LRB):  EGD (ESOPHAGOGASTRODUODENOSCOPY) (Left)  COLONOSCOPY (N/A)      Hospital Course:   The pt is a 62 yo female with Hep C Cirrhosis, HCC, Gastric lymphoma, HTN, Dm who was placed in observation for GI bleed. Hgb 11.8>10.7>10.2>11.4>9.4. (Last Hgb 1/21/19 was 13.6). No further bloody or dark stools.   GI evaluated pt and recommended EGD/Colonoscopy which showed Medium-sized hiatal hernia, Non-bleeding external and internal hemorrhoids, poor prep. No source of melena found.   H/H remained stable. Pt will follow up with GI.      Consults:   Consults (From admission, onward)        Status Ordering Provider     Inpatient consult to Gastroenterology  Once     Provider:  Mo Yadav MD    Completed MARE URBINA            Final Active Diagnoses:    Diagnosis Date Noted POA    PRINCIPAL PROBLEM:  GI bleed [K92.2] 02/18/2019 Yes    Internal  hemorrhoids [K64.8] 02/20/2019 Yes    Compensated HCV cirrhosis [B19.20, K74.69] 12/07/2018 Yes    HCC (hepatocellular carcinoma) [C22.0] 12/07/2018 Yes    Anemia due to blood loss, acute [D62] 08/11/2016 Yes    Uncontrolled diabetes mellitus type 2 without complications [E11.65] 08/11/2016 Yes    Essential hypertension [I10] 07/17/2015 Yes    Obstructive airway disease [J44.9] 04/02/2015 Yes      Problems Resolved During this Admission:       Discharged Condition: stable    Disposition: Home or Self Care    Follow Up:  Follow-up Information     Primary Doctor No.    Why:  repeat CBC            JANNET De Luna In 1 week.    Specialty:  Gastroenterology  Contact information:  [id number] [place], [place] [address] [address] [phone]                 Patient Instructions:      Ambulatory consult to Gastroenterology   Referral Priority: Routine Referral Type: Consultation   Referral Reason: Specialty Services Required   Requested Specialty: Gastroenterology   Number of Visits Requested: 1     Diet Cardiac     Activity as tolerated       Significant Diagnostic Studies:   Imaging Results    None         Pending Diagnostic Studies:     Procedure Component Value Units Date/Time    Occult blood x 1, stool [[id number]] Collected:  02/19/19 0009    Order Status:  Sent Lab Status:  In process Updated:  02/19/19 0010    Specimen:  Stool          Medications:  Reconciled Home Medications:      Medication List      START taking these medications    pantoprazole 40 MG tablet  Commonly known as:  PROTONIX  Take 1 tablet (40 mg total) by mouth once daily.        CONTINUE taking these medications    amLODIPine 10 MG tablet  Commonly known as:  NORVASC  amlodipine 10 mg tablet     furosemide 20 MG tablet  Commonly known as:  LASIX  Take 20 mg by mouth once daily.     gabapentin 300 MG capsule  Commonly known as:  NEURONTIN  Take 1 capsule (300 mg total) by mouth 3 (three) times daily.     HumaLOG Mix 75-25 KwikPen 100 unit/mL  (75-25) Inpn  Generic drug:  insulin lispro protamin-lispro  25 UNITS SUBCUTANEOUSLY IN AM AND 15 UNITS AT NIGHT     HYDROcodone-acetaminophen 5-325 mg per tablet  Commonly known as:  NORCO  Take 1 tablet by mouth every 4 (four) hours as needed. for pain.     ibandronate 150 mg tablet  Commonly known as:  BONIVA  ibandronate 150 mg tablet     iron bis-gly-FA-V-F62-Dp-succ 150 mg iron(21) -400 mcg (7) Tab  Take 1 tablet by mouth.     lactulose 20 gram/30 mL Soln  Commonly known as:  CHRONULAC  Take 20 g by mouth.     losartan 100 MG tablet  Commonly known as:  COZAAR  Take 100 mg by mouth once daily.     ONE DAILY MULTIVITAMIN per tablet  Generic drug:  multivitamin  Take 1 tablet by mouth.     * SPIRIVA WITH HANDIHALER 18 mcg inhalation capsule  Generic drug:  tiotropium  Spiriva with HandiHaler 18 mcg and inhalation capsules     * tiotropium bromide 2.5 mcg/actuation Mist  Commonly known as:  SPIRIVA RESPIMAT  Spiriva Respimat 2.5 mcg/actuation solution for inhalation     spironolactone 25 MG tablet  Commonly known as:  ALDACTONE  Take 25 mg by mouth 2 (two) times daily.         * This list has 2 medication(s) that are the same as other medications prescribed for you. Read the directions carefully, and ask your doctor or other care provider to review them with you.                Indwelling Lines/Drains at time of discharge:   Lines/Drains/Airways          None          Time spent on the discharge of patient: 42 minutes  Patient was seen and examined on the date of discharge and determined to be suitable for discharge.         Rama Peres NP  Department of Hospital Medicine  [facility]

## 2019-02-20 NOTE — NURSING
Patient stable post colonoscopy. Discharge orders reviewed with patient who verbalized understanding with teachback. IV removed. Adequate for discharge.

## 2019-02-20 NOTE — SUBJECTIVE & OBJECTIVE
Interval History: Denies abdominal pain, no further blood in stool today   Review of Systems   Constitutional: Negative for appetite change, chills, diaphoresis, fatigue, fever and unexpected weight change.   HENT: Negative for congestion, nosebleeds, sinus pressure and sore throat.    Eyes: Negative for pain, discharge and visual disturbance.   Respiratory: Negative for cough, chest tightness, shortness of breath, wheezing and stridor.    Cardiovascular: Negative for chest pain, palpitations and leg swelling.   Gastrointestinal: Negative for abdominal distention, abdominal pain, blood in stool, constipation, diarrhea, nausea and vomiting.   Endocrine: Negative for cold intolerance and heat intolerance.   Genitourinary: Negative for difficulty urinating, dysuria, flank pain, frequency and urgency.   Musculoskeletal: Negative for arthralgias, back pain, joint swelling, myalgias, neck pain and neck stiffness.   Skin: Negative for rash and wound.   Allergic/Immunologic: Negative for food allergies and immunocompromised state.   Neurological: Negative for dizziness, seizures, syncope, facial asymmetry, speech difficulty, weakness, light-headedness, numbness and headaches.   Hematological: Negative for adenopathy.   Psychiatric/Behavioral: Negative for agitation, confusion and hallucinations.   Objective:     Vital Signs (Most Recent):  Temp: 97.7 °F (36.5 °C) (02/20/19 1300)  Pulse: 67 (02/20/19 1300)  Resp: 20 (02/20/19 1300)  BP: (!) 156/86 (02/20/19 1300)  SpO2: 100 % (02/20/19 1300) Vital Signs (24h Range):  Temp:  [96.5 °F (35.8 °C)-98.6 °F (37 °C)] 97.7 °F (36.5 °C)  Pulse:  [67-83] 67  Resp:  [18-20] 20  SpO2:  [95 %-100 %] 100 %  BP: (136-156)/(65-86) 156/86     Weight: 69 kg (152 lb 1.9 oz)  Body mass index is 24.55 kg/m².    Intake/Output Summary (Last 24 hours) at 2/20/2019 1303  Last data filed at 2/20/2019 1000  Gross per 24 hour   Intake 0 ml   Output --   Net 0 ml      Physical Exam   Constitutional: She  is oriented to person, place, and time. She appears well-developed and well-nourished. No distress.   HENT:   Head: Normocephalic and atraumatic.   Nose: Nose normal.   Mouth/Throat: Oropharynx is clear and moist.   Eyes: Conjunctivae and EOM are normal. No scleral icterus.   Neck: Normal range of motion. Neck supple. No tracheal deviation present.   Cardiovascular: Normal rate, regular rhythm, normal heart sounds and intact distal pulses. Exam reveals no gallop and no friction rub.   No murmur heard.  Pulmonary/Chest: Effort normal and breath sounds normal. No stridor. No respiratory distress. She has no wheezes. She has no rales. She exhibits no tenderness.   Abdominal: Soft. Bowel sounds are normal. She exhibits no distension and no mass. There is no tenderness. There is no rebound and no guarding.   Musculoskeletal: Normal range of motion. She exhibits no edema, tenderness or deformity.   Neurological: She is alert and oriented to person, place, and time. No cranial nerve deficit. She exhibits normal muscle tone. Coordination normal.   Skin: Skin is warm and dry. No rash noted. She is not diaphoretic. No erythema. No pallor.   Psychiatric: She has a normal mood and affect. Her behavior is normal. Thought content normal.   Nursing note and vitals reviewed.      Significant Labs:   BMP:   Recent Labs   Lab 02/18/19  1725  02/20/19  0422   *   < > 116*      < > 139   K 4.6   < > 3.8      < > 109   CO2 27   < > 23   BUN 29*   < > 23   CREATININE 1.4   < > 1.3   CALCIUM 8.7   < > 7.7*   MG 2.2  --   --     < > = values in this interval not displayed.     CBC:   Recent Labs   Lab 02/18/19  1725  02/19/19  0523 02/19/19  1125 02/19/19  1745 02/20/19  0422   WBC 5.59  --  4.71  --   --  4.23   HGB 11.8*   < > 10.2*  10.2* 10.4* 11.3* 9.4*   HCT 34.8*   < > 30.7*  30.7* 31.4* 34.0* 27.7*   PLT 98*  --  89*  --   --  88*    < > = values in this interval not displayed.     CMP:   Recent Labs   Lab  02/18/19  1725 02/19/19  0523 02/20/19  0422    138 139   K 4.6 4.1 3.8    109 109   CO2 27 25 23   * 106 116*   BUN 29* 28* 23   CREATININE 1.4 1.2 1.3   CALCIUM 8.7 7.9* 7.7*   PROT 6.6 5.3* 5.3*   ALBUMIN 2.3* 2.0* 1.9*   BILITOT 0.5 0.9 0.8   ALKPHOS 312* 224* 171*   AST 82* 71* 74*   ALT 40 36 35   ANIONGAP 7* 4* 7*   EGFRNONAA 40* 48* 43*     All pertinent labs within the past 24 hours have been reviewed.    Significant Imaging:  Imaging Results    None

## 2019-02-20 NOTE — ANESTHESIA POSTPROCEDURE EVALUATION
"Anesthesia Post Evaluation    Patient: Chani Aviles    Procedure(s) Performed: Procedure(s) (LRB):  EGD (ESOPHAGOGASTRODUODENOSCOPY) (Left)  COLONOSCOPY (N/A)    Final Anesthesia Type: MAC  Patient location during evaluation: GI PACU  Patient participation: Yes- Able to Participate  Level of consciousness: awake and alert  Post-procedure vital signs: reviewed and stable  Pain management: adequate  Airway patency: patent  PONV status at discharge: No PONV  Anesthetic complications: no      Cardiovascular status: blood pressure returned to baseline  Respiratory status: unassisted and spontaneous ventilation  Hydration status: euvolemic  Follow-up not needed.        Visit Vitals  BP (!) 157/71 (BP Location: Left arm, Patient Position: Lying)   Pulse 75   Temp 36.7 °C (98.1 °F)   Resp 20   Ht 5' 6" (1.676 m)   Wt 69 kg (152 lb 1.9 oz)   SpO2 100%   Breastfeeding? No   BMI 24.55 kg/m²       Pain/Rosa Isela Score: Rosa Isela Score: 10 (2/20/2019  2:15 PM)        "

## 2019-02-20 NOTE — ASSESSMENT & PLAN NOTE
Monitor MELD labs  GI following   Cont Spironolactone and Lasix       MELD-Na score: 10 at 2/20/2019  4:22 AM  MELD score: 10 at 2/20/2019  4:22 AM  Calculated from:  Serum Creatinine: 1.3 mg/dL at 2/20/2019  4:22 AM  Serum Sodium: 139 mmol/L (Rounded to 137 mmol/L) at 2/20/2019  4:22 AM  Total Bilirubin: 0.8 mg/dL (Rounded to 1 mg/dL) at 2/20/2019  4:22 AM  INR(ratio): 1.1 at 2/20/2019  4:22 AM  Age: 63 years

## 2019-02-20 NOTE — TRANSFER OF CARE
"Anesthesia Transfer of Care Note    Patient: Chani Aviles    Procedure(s) Performed: Procedure(s) (LRB):  EGD (ESOPHAGOGASTRODUODENOSCOPY) (Left)  COLONOSCOPY (N/A)    Patient location: GI    Anesthesia Type: MAC    Transport from OR: Transported from OR on room air with adequate spontaneous ventilation    Post pain: adequate analgesia    Post assessment: no apparent anesthetic complications    Post vital signs: stable    Level of consciousness: awake, alert and oriented    Nausea/Vomiting: no nausea/vomiting    Complications: none    Transfer of care protocol was followed      Last vitals:   Visit Vitals  /63   Pulse 89   Temp 36.5 °C (97.7 °F)   Resp 16   Ht 5' 6" (1.676 m)   Wt 69 kg (152 lb 1.9 oz)   SpO2 99%   Breastfeeding? No   BMI 24.55 kg/m²     "

## 2019-02-20 NOTE — BRIEF OP NOTE
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     Estimated Blood Loss: None         Specimens:   Specimen (12h ago, onward)    Start     Ordered    02/20/19 1334  Specimen to Pathology - Surgery  Once     Comments:  1. Antrum bx., r/o h.pylori     Start Status   02/20/19 1334 Collected (02/20/19 1351)       02/20/19 1350

## 2019-02-20 NOTE — PROGRESS NOTES
[facility] Hospital Medicine  Progress Note    Patient Name: Chani Aviles  MRN: [id number]  Patient Class: OP- Observation   Admission Date: 2/18/2019  Length of Stay: 0 days  Attending Physician: Malik Martinez MD  Primary Care Provider: Primary Doctor No        Subjective:     Principal Problem:GI bleed    HPI:  Chani Aviles is a 63 y.o. female   with history including hep C, hepatocellular carcinoma, esophageal varices, H pylori infection last year, presents for melena which onset gradually 4 days ago. Symptoms are episodic . No mitigating or exacerbating factors reported. Associated sxs include rectal pain and diffuse abd pain. No prior treatment.  No further complaints or concerns at this time.   patient was evaluated in the emergency room H&H 11.8 and 34.8, BUN 29.  To note patient was recently seen at [facility] February 12, 2019 for chest pain workup, patient denies any coronary complaints at this time.  See Care everywhere.  Hospital Medicine was consulted patient placed in observation.        Hospital Course:  The pt is a 62 yo female with Hep C Cirrhosis, HCC, Gastric lymphoma, HTN, Dm who was placed in observation for GI bleed. Hgb 11.8>10.7>10.2>11.4>9.4. (Last Hgb 1/21/19 was 13.6). No further bloody or dark stools.   GI evaluated pt and recommended EGD/Colonoscopy today     Interval History: Denies abdominal pain, no further blood in stool today   Review of Systems   Constitutional: Negative for appetite change, chills, diaphoresis, fatigue, fever and unexpected weight change.   HENT: Negative for congestion, nosebleeds, sinus pressure and sore throat.    Eyes: Negative for pain, discharge and visual disturbance.   Respiratory: Negative for cough, chest tightness, shortness of breath, wheezing and stridor.    Cardiovascular: Negative for chest pain, palpitations and leg swelling.   Gastrointestinal: Negative for abdominal distention, abdominal pain, blood in stool,  constipation, diarrhea, nausea and vomiting.   Endocrine: Negative for cold intolerance and heat intolerance.   Genitourinary: Negative for difficulty urinating, dysuria, flank pain, frequency and urgency.   Musculoskeletal: Negative for arthralgias, back pain, joint swelling, myalgias, neck pain and neck stiffness.   Skin: Negative for rash and wound.   Allergic/Immunologic: Negative for food allergies and immunocompromised state.   Neurological: Negative for dizziness, seizures, syncope, facial asymmetry, speech difficulty, weakness, light-headedness, numbness and headaches.   Hematological: Negative for adenopathy.   Psychiatric/Behavioral: Negative for agitation, confusion and hallucinations.   Objective:     Vital Signs (Most Recent):  Temp: 97.7 °F (36.5 °C) (02/20/19 1300)  Pulse: 67 (02/20/19 1300)  Resp: 20 (02/20/19 1300)  BP: (!) 156/86 (02/20/19 1300)  SpO2: 100 % (02/20/19 1300) Vital Signs (24h Range):  Temp:  [96.5 °F (35.8 °C)-98.6 °F (37 °C)] 97.7 °F (36.5 °C)  Pulse:  [67-83] 67  Resp:  [18-20] 20  SpO2:  [95 %-100 %] 100 %  BP: (136-156)/(65-86) 156/86     Weight: 69 kg (152 lb 1.9 oz)  Body mass index is 24.55 kg/m².    Intake/Output Summary (Last 24 hours) at 2/20/2019 1303  Last data filed at 2/20/2019 1000  Gross per 24 hour   Intake 0 ml   Output --   Net 0 ml      Physical Exam   Constitutional: She is oriented to person, place, and time. She appears well-developed and well-nourished. No distress.   HENT:   Head: Normocephalic and atraumatic.   Nose: Nose normal.   Mouth/Throat: Oropharynx is clear and moist.   Eyes: Conjunctivae and EOM are normal. No scleral icterus.   Neck: Normal range of motion. Neck supple. No tracheal deviation present.   Cardiovascular: Normal rate, regular rhythm, normal heart sounds and intact distal pulses. Exam reveals no gallop and no friction rub.   No murmur heard.  Pulmonary/Chest: Effort normal and breath sounds normal. No stridor. No respiratory distress.  She has no wheezes. She has no rales. She exhibits no tenderness.   Abdominal: Soft. Bowel sounds are normal. She exhibits no distension and no mass. There is no tenderness. There is no rebound and no guarding.   Musculoskeletal: Normal range of motion. She exhibits no edema, tenderness or deformity.   Neurological: She is alert and oriented to person, place, and time. No cranial nerve deficit. She exhibits normal muscle tone. Coordination normal.   Skin: Skin is warm and dry. No rash noted. She is not diaphoretic. No erythema. No pallor.   Psychiatric: She has a normal mood and affect. Her behavior is normal. Thought content normal.   Nursing note and vitals reviewed.      Significant Labs:   BMP:   Recent Labs   Lab 02/18/19  1725 02/20/19  0422   *   < > 116*      < > 139   K 4.6   < > 3.8      < > 109   CO2 27   < > 23   BUN 29*   < > 23   CREATININE 1.4   < > 1.3   CALCIUM 8.7   < > 7.7*   MG 2.2  --   --     < > = values in this interval not displayed.     CBC:   Recent Labs   Lab 02/18/19  1725  02/19/19  0523 02/19/19  1125 02/19/19  1745 02/20/19  0422   WBC 5.59  --  4.71  --   --  4.23   HGB 11.8*   < > 10.2*  10.2* 10.4* 11.3* 9.4*   HCT 34.8*   < > 30.7*  30.7* 31.4* 34.0* 27.7*   PLT 98*  --  89*  --   --  88*    < > = values in this interval not displayed.     CMP:   Recent Labs   Lab 02/18/19  1725 02/19/19  0523 02/20/19  0422    138 139   K 4.6 4.1 3.8    109 109   CO2 27 25 23   * 106 116*   BUN 29* 28* 23   CREATININE 1.4 1.2 1.3   CALCIUM 8.7 7.9* 7.7*   PROT 6.6 5.3* 5.3*   ALBUMIN 2.3* 2.0* 1.9*   BILITOT 0.5 0.9 0.8   ALKPHOS 312* 224* 171*   AST 82* 71* 74*   ALT 40 36 35   ANIONGAP 7* 4* 7*   EGFRNONAA 40* 48* 43*     All pertinent labs within the past 24 hours have been reviewed.    Significant Imaging:  Imaging Results    None       Assessment/Plan:      * GI bleed    Cont Protonix  Monitor H/H   EGD and colonoscopy today       Uncontrolled  diabetes mellitus type 2 without complications    NISS       Obstructive airway disease    Stable no respiratory complaints  Continue Atrovent.     Anemia due to blood loss, acute    Monitor H/H  Transfuse prn      Essential hypertension    Cont Lasix and Spironolactone  Hold Amlodipine and Losartan for now        HCC (hepatocellular carcinoma)    F/U with hepatology        Compensated HCV cirrhosis    Monitor MELD labs  GI following   Cont Spironolactone and Lasix       MELD-Na score: 10 at 2/20/2019  4:22 AM  MELD score: 10 at 2/20/2019  4:22 AM  Calculated from:  Serum Creatinine: 1.3 mg/dL at 2/20/2019  4:22 AM  Serum Sodium: 139 mmol/L (Rounded to 137 mmol/L) at 2/20/2019  4:22 AM  Total Bilirubin: 0.8 mg/dL (Rounded to 1 mg/dL) at 2/20/2019  4:22 AM  INR(ratio): 1.1 at 2/20/2019  4:22 AM  Age: 63 years         VTE Risk Mitigation (From admission, onward)        Ordered     IP VTE HIGH RISK PATIENT  Once      02/18/19 1934     Place sequential compression device  Until discontinued      02/18/19 1934     Place CAROL hose  Until discontinued      02/18/19 1934              Rama Peres NP  Department of Hospital Medicine   [facility]

## 2019-02-20 NOTE — INTERVAL H&P NOTE
The patient has been examined and the H&P has been reviewed:    I concur with the findings and no changes have occurred since H&P was written.    Anesthesia/Surgery risks, benefits and alternative options discussed and understood by patient/family.          Active Hospital Problems    Diagnosis  POA    *GI bleed [K92.2]  Yes    Compensated HCV cirrhosis [B19.20, K74.69]  Yes    HCC (hepatocellular carcinoma) [C22.0]  Yes    Anemia due to blood loss, acute [D62]  Yes     Last Assessment & Plan:   Will check Iron Level today      Uncontrolled diabetes mellitus type 2 without complications [E11.65]  Yes     Last Assessment & Plan:   At Goal per 8/16 labs. No b/s log brought to clinic visit      Essential hypertension [I10]  Yes     Last Assessment & Plan:   Not @ Goal. No b/p log brought to clinic as advised. Will increase Losartan to 100 mg daily and + Norvasc 10 mg daily. Pt ordered to bring log to clinic in 2 weeks      Obstructive airway disease [J44.9]  Yes     Overview:   PFT done 4/2/15-mild OAD.         Resolved Hospital Problems   No resolved problems to display.

## 2019-02-20 NOTE — PLAN OF CARE
Problem: Adult Inpatient Plan of Care  Goal: Plan of Care Review  Outcome: Ongoing (interventions implemented as appropriate)  Patient remained free from injury. Blood glucose monitored. Taking golitely in preparation for colonscopy in AM. Denies pain. No s/s of acute distress. 12hr chart check complete.

## 2019-02-20 NOTE — [employer] PATIENT INSTRUCTIONS
Discharge Summary/Instructions after an Endoscopic Procedure  Patient Name: Chani Aviles  Patient MRN: [id number]  Patient YOB: 1955 Wednesday, February 20, 2019 Mo Yadav MD  RESTRICTIONS:  During your procedure today, you received medications for sedation.  These   medications may affect your judgment, balance and coordination.  Therefore,   for 24 hours, you have the following restrictions:   - DO NOT drive a car, operate machinery, make legal/financial decisions,   sign important papers or drink alcohol.    ACTIVITY:  Today: no heavy lifting, straining or running due to procedural   sedation/anesthesia.  The following day: return to full activity including work.  DIET:  Eat and drink normally unless instructed otherwise.     TREATMENT FOR COMMON SIDE EFFECTS:  - Mild abdominal pain, nausea, belching, bloating or excessive gas:  rest,   eat lightly and use a heating pad.  - Sore Throat: treat with throat lozenges and/or gargle with warm salt   water.  - Because air was used during the procedure, expelling large amounts of air   from your rectum or belching is normal.  - If a bowel prep was taken, you may not have a bowel movement for 1-3 days.    This is normal.  SYMPTOMS TO WATCH FOR AND REPORT TO YOUR PHYSICIAN:  1. Abdominal pain or bloating, other than gas cramps.  2. Chest pain.  3. Back pain.  4. Signs of infection such as: chills or fever occurring within 24 hours   after the procedure.  5. Rectal bleeding, which would show as bright red, maroon, or black stools.   (A tablespoon of blood from the rectum is not serious, especially if   hemorrhoids are present.)  6. Vomiting.  7. Weakness or dizziness.  GO DIRECTLY TO THE NEAREST EMERGENCY ROOM IF YOU HAVE ANY OF THE FOLLOWING:      Difficulty breathing              Chills and/or fever over 101 F   Persistent vomiting and/or vomiting blood   Severe abdominal pain   Severe chest pain   Black, tarry stools   Bleeding- more than one  tablespoon   Any other symptom or condition that you feel may need urgent attention  Your doctor recommends these additional instructions:  If any biopsies were taken, your doctors clinic will contact you in 1 to 2   weeks with any results.  - The patient will be observed post-procedure, until all discharge criteria   are met.   - [facility].   - Patient has a contact number available for emergencies.  The signs and   symptoms of potential delayed complications were discussed with the   patient.  Return to normal activities tomorrow.  Written discharge   instructions were provided to the patient.   - Resume previous diet.   - Continue present medications.   - Repeat colonoscopy in 2 years because the bowel preparation was poor and   for screening purposes.   - Return to referring physician as previously scheduled.  For questions, problems or results please call your physician Mo Yadav MD at Work:  [phone]  If you have any questions about the above instructions, call the GI   department at [phone] or call the endoscopy unit at [phone]   from 7am until 3 pm.  [facility] [place], EMERGENCY ROOM PHONE NUMBER:   [phone]  IF A COMPLICATION OR EMERGENCY SITUATION ARISES AND YOU ARE UNABLE TO REACH   YOUR PHYSICIAN - GO DIRECTLY TO THE EMERGENCY ROOM.  I have read or have had read to me these discharge instructions for my   procedure and have received a written copy.  I understand these   instructions and will follow-up with my physician if I have any questions.     __________________________________       _____________________________________  Nurse Signature                                          Patient/Designated   Responsible Party Signature  Mo Yadav MD  2/20/2019 2:05:28 PM  This report has been verified and signed electronically.  [employer]

## 2019-02-20 NOTE — [employer] PATIENT INSTRUCTIONS
Discharge Summary/Instructions after an Endoscopic Procedure  Patient Name: Chani Aviles  Patient MRN: [id number]  Patient YOB: 1955 Wednesday, February 20, 2019 Mo Yadav MD  RESTRICTIONS:  During your procedure today, you received medications for sedation.  These   medications may affect your judgment, balance and coordination.  Therefore,   for 24 hours, you have the following restrictions:   - DO NOT drive a car, operate machinery, make legal/financial decisions,   sign important papers or drink alcohol.    ACTIVITY:  Today: no heavy lifting, straining or running due to procedural   sedation/anesthesia.  The following day: return to full activity including work.  DIET:  Eat and drink normally unless instructed otherwise.     TREATMENT FOR COMMON SIDE EFFECTS:  - Mild abdominal pain, nausea, belching, bloating or excessive gas:  rest,   eat lightly and use a heating pad.  - Sore Throat: treat with throat lozenges and/or gargle with warm salt   water.  - Because air was used during the procedure, expelling large amounts of air   from your rectum or belching is normal.  - If a bowel prep was taken, you may not have a bowel movement for 1-3 days.    This is normal.  SYMPTOMS TO WATCH FOR AND REPORT TO YOUR PHYSICIAN:  1. Abdominal pain or bloating, other than gas cramps.  2. Chest pain.  3. Back pain.  4. Signs of infection such as: chills or fever occurring within 24 hours   after the procedure.  5. Rectal bleeding, which would show as bright red, maroon, or black stools.   (A tablespoon of blood from the rectum is not serious, especially if   hemorrhoids are present.)  6. Vomiting.  7. Weakness or dizziness.  GO DIRECTLY TO THE NEAREST EMERGENCY ROOM IF YOU HAVE ANY OF THE FOLLOWING:      Difficulty breathing              Chills and/or fever over 101 F   Persistent vomiting and/or vomiting blood   Severe abdominal pain   Severe chest pain   Black, tarry stools   Bleeding- more than one  tablespoon   Any other symptom or condition that you feel may need urgent attention  Your doctor recommends these additional instructions:  If any biopsies were taken, your doctors clinic will contact you in 1 to 2   weeks with any results.  - The patient will be observed post-procedure, until all discharge criteria   are met.   - [facility] patient to [facility] for ongoing care.   - Patient has a contact number available for emergencies.  The signs and   symptoms of potential delayed complications were discussed with the   patient.  Return to normal activities tomorrow.  Written discharge   instructions were provided to the patient.   - Resume previous diet.   - Continue present medications.   - Await pathology results.   - No repeat upper endoscopy.   - Return to referring physician as previously scheduled.  For questions, problems or results please call your physician Mo Yadav MD at Work:  [phone]  If you have any questions about the above instructions, call the GI   department at [phone] or call the endoscopy unit at [phone]   from 7am until 3 pm.  [facility], EMERGENCY ROOM PHONE NUMBER:   [phone]  IF A COMPLICATION OR EMERGENCY SITUATION ARISES AND YOU ARE UNABLE TO REACH   YOUR PHYSICIAN - GO DIRECTLY TO THE EMERGENCY ROOM.  I have read or have had read to me these discharge instructions for my   procedure and have received a written copy.  I understand these   instructions and will follow-up with my physician if I have any questions.     __________________________________       _____________________________________  Nurse Signature                                          Patient/Designated   Responsible Party Signature  Mo Yadav MD  2/20/2019 2:01:28 PM  This report has been verified and signed electronically.  [employer]

## 2019-02-20 NOTE — ANESTHESIA PREPROCEDURE EVALUATION
02/20/2019  Chani Aviles is a 63 y.o., female.    Anesthesia Evaluation      I have reviewed the Medications.     Review of Systems  Anesthesia Hx:  No problems with previous Anesthesia   Hematology/Oncology:         -- Anemia:   Cardiovascular:   Hypertension, well controlled    Pulmonary:   COPD, mild    Hepatic/GI:   Liver Disease, Hepatitis, C Hepatocellular carcinoma    Gastric lymphoma   Endocrine:   Diabetes, poorly controlled, type 2        Physical Exam  General:  Well nourished    Airway/Jaw/Neck:  Airway Findings: Mouth Opening: Normal Tongue: Normal  General Airway Assessment: Adult  Mallampati: I      Dental:  Dental Findings: In tact   Chest/Lungs:  Chest/Lungs Findings: Normal Respiratory Rate     Heart/Vascular:  Heart Findings: Rate: Normal  Rhythm: Regular Rhythm             Anesthesia Plan  Type of Anesthesia, risks & benefits discussed:  Anesthesia Type:  MAC  Patient's Preference:   Intra-op Monitoring Plan: standard ASA monitors  Intra-op Monitoring Plan Comments:   Post Op Pain Control Plan: multimodal analgesia  Post Op Pain Control Plan Comments:   Induction:   IV  Beta Blocker:  Patient is not currently on a Beta-Blocker (No further documentation required).       Informed Consent: Patient understands risks and agrees with Anesthesia plan.  Questions answered. Anesthesia consent signed with patient.  ASA Score: 2     Day of Surgery Review of History & Physical:  There are no significant changes.          Ready For Surgery From Anesthesia Perspective.

## 2019-03-01 NOTE — PROGRESS NOTES
Please let the patient know that biopsies taken during procedure are essentially OK. No further action is needed.     Mo Yadav M.D.

## 2019-03-19 ENCOUNTER — HOSPITAL ENCOUNTER (EMERGENCY)
Facility: HOSPITAL | Age: 64
Discharge: HOME | End: 2019-03-19
Attending: EMERGENCY MEDICINE
Payer: MEDICAID

## 2019-03-19 VITALS
BODY MASS INDEX: 24.06 KG/M2 | HEIGHT: 66 IN | WEIGHT: 149.69 LBS | RESPIRATION RATE: 20 BRPM | OXYGEN SATURATION: 97 % | SYSTOLIC BLOOD PRESSURE: 148 MMHG | DIASTOLIC BLOOD PRESSURE: 84 MMHG | TEMPERATURE: 98 F | HEART RATE: 87 BPM

## 2019-03-19 DIAGNOSIS — K92.1 MELENA: Primary | ICD-10-CM

## 2019-03-19 LAB
ALBUMIN SERPL BCP-MCNC: 2.5 G/DL
ALP SERPL-CCNC: 206 U/L
ALT SERPL W/O P-5'-P-CCNC: 31 U/L
ANION GAP SERPL CALC-SCNC: 10 MMOL/L
APTT BLDCRRT: 26.2 SEC
AST SERPL-CCNC: 59 U/L
BASOPHILS # BLD AUTO: 0.02 K/UL
BASOPHILS NFR BLD: 0.4 %
BILIRUB SERPL-MCNC: 0.6 MG/DL
BUN SERPL-MCNC: 22 MG/DL
CALCIUM SERPL-MCNC: 8.7 MG/DL
CHLORIDE SERPL-SCNC: 107 MMOL/L
CO2 SERPL-SCNC: 24 MMOL/L
CREAT SERPL-MCNC: 1.4 MG/DL
DIFFERENTIAL METHOD: ABNORMAL
EOSINOPHIL # BLD AUTO: 0.1 K/UL
EOSINOPHIL NFR BLD: 2 %
ERYTHROCYTE [DISTWIDTH] IN BLOOD BY AUTOMATED COUNT: 13.4 %
EST. GFR  (AFRICAN AMERICAN): 46 ML/MIN/1.73 M^2
EST. GFR  (NON AFRICAN AMERICAN): 40 ML/MIN/1.73 M^2
GLUCOSE SERPL-MCNC: 133 MG/DL
HCT VFR BLD AUTO: 33 %
HGB BLD-MCNC: 11 G/DL
INR PPP: 1.1
LACTATE SERPL-SCNC: 1.5 MMOL/L
LYMPHOCYTES # BLD AUTO: 1 K/UL
LYMPHOCYTES NFR BLD: 22.3 %
MCH RBC QN AUTO: 32.4 PG
MCHC RBC AUTO-ENTMCNC: 33.3 G/DL
MCV RBC AUTO: 97 FL
MONOCYTES # BLD AUTO: 0.5 K/UL
MONOCYTES NFR BLD: 10.5 %
NEUTROPHILS # BLD AUTO: 3 K/UL
NEUTROPHILS NFR BLD: 64.8 %
OB PNL STL: POSITIVE
PLATELET # BLD AUTO: 117 K/UL
PMV BLD AUTO: 12.4 FL
POTASSIUM SERPL-SCNC: 4.7 MMOL/L
PROT SERPL-MCNC: 6.8 G/DL
PROTHROMBIN TIME: 11.4 SEC
RBC # BLD AUTO: 3.4 M/UL
SODIUM SERPL-SCNC: 141 MMOL/L
WBC # BLD AUTO: 4.57 K/UL

## 2019-03-19 PROCEDURE — 83605 ASSAY OF LACTIC ACID: CPT

## 2019-03-19 PROCEDURE — 82272 OCCULT BLD FECES 1-3 TESTS: CPT

## 2019-03-19 PROCEDURE — 36415 COLL VENOUS BLD VENIPUNCTURE: CPT

## 2019-03-19 PROCEDURE — 96374 THER/PROPH/DIAG INJ IV PUSH: CPT

## 2019-03-19 PROCEDURE — 85610 PROTHROMBIN TIME: CPT

## 2019-03-19 PROCEDURE — 99284 EMERGENCY DEPT VISIT MOD MDM: CPT | Mod: 25

## 2019-03-19 PROCEDURE — C9113 INJ PANTOPRAZOLE SODIUM, VIA: HCPCS | Performed by: EMERGENCY MEDICINE

## 2019-03-19 PROCEDURE — 85025 COMPLETE CBC W/AUTO DIFF WBC: CPT

## 2019-03-19 PROCEDURE — 80053 COMPREHEN METABOLIC PANEL: CPT

## 2019-03-19 PROCEDURE — 63600175 PHARM REV CODE 636 W HCPCS: Performed by: EMERGENCY MEDICINE

## 2019-03-19 PROCEDURE — 85730 THROMBOPLASTIN TIME PARTIAL: CPT

## 2019-03-19 RX ORDER — PANTOPRAZOLE SODIUM 40 MG/10ML
80 INJECTION, POWDER, LYOPHILIZED, FOR SOLUTION INTRAVENOUS
Status: COMPLETED | OUTPATIENT
Start: 2019-03-19 | End: 2019-03-19

## 2019-03-19 RX ADMIN — PANTOPRAZOLE SODIUM 80 MG: 40 INJECTION, POWDER, LYOPHILIZED, FOR SOLUTION INTRAVENOUS at 03:03

## 2019-03-19 NOTE — ED NOTES
Attempted to obtain IV access twice but was unsuccessful. GRETEL Peterson at bedside attempting IV access now.

## 2019-03-20 ENCOUNTER — TELEPHONE (OUTPATIENT)
Dept: GASTROENTEROLOGY | Facility: CLINIC | Age: 64
End: 2019-03-20

## 2019-03-20 NOTE — TELEPHONE ENCOUNTER
Returned call to pt who stated she was seen in the ER and told to follow up with Dr. Yadav for rectal bleeding. Pt did not want to wait for first available with Dr. Yadav so an appointment was scheduled with Rodrick Natarajan.

## 2019-03-20 NOTE — TELEPHONE ENCOUNTER
----- Message from Jadiel Sanderson sent at 3/20/2019  9:54 AM CDT -----  Pt is requesting a call from nurse to discuss an appt after a procedure.            Please call pt back [phone] ( home )  or [phone] ( cell )

## 2019-03-27 ENCOUNTER — OFFICE VISIT (OUTPATIENT)
Dept: GASTROENTEROLOGY | Facility: CLINIC | Age: 64
End: 2019-03-27
Payer: MEDICAID

## 2019-03-27 VITALS
DIASTOLIC BLOOD PRESSURE: 78 MMHG | HEIGHT: 66 IN | SYSTOLIC BLOOD PRESSURE: 156 MMHG | BODY MASS INDEX: 24.41 KG/M2 | WEIGHT: 151.88 LBS | HEART RATE: 100 BPM

## 2019-03-27 DIAGNOSIS — K59.09 CONSTIPATION, CHRONIC: ICD-10-CM

## 2019-03-27 DIAGNOSIS — K62.5 RECTAL BLEEDING: Primary | ICD-10-CM

## 2019-03-27 DIAGNOSIS — C22.0 HCC (HEPATOCELLULAR CARCINOMA): ICD-10-CM

## 2019-03-27 PROCEDURE — 99999 PR PBB SHADOW E&M-EST. PATIENT-LVL IV: ICD-10-PCS | Mod: PBBFAC,,, | Performed by: PHYSICIAN ASSISTANT

## 2019-03-27 PROCEDURE — 99214 OFFICE O/P EST MOD 30 MIN: CPT | Mod: PBBFAC | Performed by: PHYSICIAN ASSISTANT

## 2019-03-27 PROCEDURE — 99213 PR OFFICE/OUTPT VISIT, EST, LEVL III, 20-29 MIN: ICD-10-PCS | Mod: S$PBB,,, | Performed by: PHYSICIAN ASSISTANT

## 2019-03-27 PROCEDURE — 99213 OFFICE O/P EST LOW 20 MIN: CPT | Mod: S$PBB,,, | Performed by: PHYSICIAN ASSISTANT

## 2019-03-27 PROCEDURE — 99999 PR PBB SHADOW E&M-EST. PATIENT-LVL IV: CPT | Mod: PBBFAC,,, | Performed by: PHYSICIAN ASSISTANT

## 2019-03-27 RX ORDER — PANTOPRAZOLE SODIUM 20 MG/1
20 TABLET, DELAYED RELEASE ORAL DAILY
Qty: 30 TABLET | Refills: 11 | Status: SHIPPED | OUTPATIENT
Start: 2019-03-27 | End: 2020-03-26

## 2019-03-27 NOTE — PROGRESS NOTES
Subjective:      Patient ID: Chani Aviles is a 63 y.o. female.    Chief Complaint: Consult and Rectal Bleeding (x's 1 since ER 2 weeks ago)    HPI  The patient presents to GI clinic for a hospital follow up. She was admitted to [facility] in February for blood in the stool and a drop in Hgb. An EGD and Colonoscopy were done which was only significant for internal and external hemorrhoids. She was discharged with Protonix 40 mg. She has a history of chronic constipation and most recently took Miralax.      Today she reports improvement in symptoms. Patient confirms only 1 episode of mild rectal bleeding which occurred shortly after ED visit.  Described as both bright and dark red blood on stool. Was told to take Miralax daily, but denies regular use because she feels she is going well. Patient states she has been taking Protonix daily which helps with heartburn. Patient confirms intermittent RUQ discomfort which has been an issue since HCC diagnosis. Denies nausea, vomiting, weight loss, change in appetite, diarrhea, or constipation.     Of note, the patient has a history of cirrhosis and HCC which is followed by Dr. Yeboah. Patient states she has a follow-up with Dr. Yeboah at [facility] within the next month.    Review of Systems   Constitutional: Negative for fever.   HENT: Negative for hearing loss.    Eyes: Negative for visual disturbance.   Respiratory: Positive for shortness of breath. Negative for cough.    Cardiovascular: Negative for chest pain.   Gastrointestinal:        As per HPI.   Genitourinary: Negative for dysuria, frequency and hematuria.   Musculoskeletal: Positive for arthralgias and back pain.   Skin: Negative for rash.   Neurological: Negative for seizures, syncope, numbness and headaches.   Hematological: Does not bruise/bleed easily.   Psychiatric/Behavioral: The patient is not nervous/anxious.        Objective:     Physical Exam   Constitutional: She is oriented to person, place, and time. She appears  well-developed and well-nourished. No distress.   HENT:   Head: Normocephalic and atraumatic.   Eyes: EOM are normal.   Cardiovascular: Normal rate and regular rhythm.   Pulmonary/Chest: Effort normal and breath sounds normal. No respiratory distress. She has no wheezes.   Abdominal: Soft. Bowel sounds are normal. She exhibits no distension. There is tenderness (mild ttp of RUQ).   Neurological: She is alert and oriented to person, place, and time. No cranial nerve deficit.   Skin: She is not diaphoretic.   Psychiatric: Her behavior is normal.       Assessment:     1. Rectal bleeding    2. Constipation, chronic    3. HCC (hepatocellular carcinoma)        Plan:   Etiology of rectal bleeding likely related to internal and external hemorrhoids. Patient has had near resolution of symptoms since ED visit. Advised to continue on daily Miralax to avoid issues with chronic constipation. Can try OTC hemorrhoid cream as needed.  Ok to continue Protonix but recommend trying 20 mg daily due to potential long term side effects.   Follow up with Dr. Yeboah at [facility] as scheduled.   Will need repeat colonoscopy in 2 years due to poor prep     Follow up if symptoms worsen or fail to improve.    Thank you for the opportunity to participate in the care of this patient.   Rodrick Natarajan PA-C.

## 2019-03-27 NOTE — LETTER
March 27, 2019      Mo Yadav MD  [address]  [place] [address]           [facility]ly [facility]  [address] [place] [id number]  Phone: [phone]  Fax: [phone]          Patient: Chani Aviles   MR Number: [id number]   YOB: 1955   Date of Visit: 3/27/2019       Dear Dr. Mo Yadav:    Thank you for referring Chani Aviels to me for evaluation. Attached you will find relevant portions of my assessment and plan of care.    If you have questions, please do not hesitate to call me. I look forward to following Chani Aviles along with you.    Sincerely,    LY Ortizosure  CC:  No Recipients    If you would like to receive this communication electronically, please contact [email or web address] or [phone] to request more information on [employer] access.    For providers and/or their staff who would like to refer a patient to [facility], please contact us through our one-stop-shop provider referral line, [facility] , at [phone].    If you feel you have received this communication in error or would no longer like to receive these types of communications, please e-mail [email or web address]

## 2019-04-01 ENCOUNTER — TELEPHONE (OUTPATIENT)
Dept: GASTROENTEROLOGY | Facility: CLINIC | Age: 64
End: 2019-04-01

## 2019-04-01 NOTE — TELEPHONE ENCOUNTER
Call placed to home phone, no answer, left message, call placed to [phone] told by daughter to call home number     ----- Message from Delfina Spangler sent at 4/1/2019 10:29 AM CDT -----  Contact: pt  The pt request a call concerning a treatment plan for the cancer in her liver, no additional info given, can be reached at [phone] or [phone]///[email or web address]

## 2019-04-02 NOTE — TELEPHONE ENCOUNTER
"Called patient back,  patient question, "has Dr Yeboah gotten in touch with Dr. Kwan regarding resuming embolization treatments?" Asked patient if she tried contacting [facility] office,  patient reports no,  she sees her doctors over here, like Dr. Natarajan, Dr Yadav.   "

## 2019-04-03 ENCOUNTER — TELEPHONE (OUTPATIENT)
Dept: GASTROENTEROLOGY | Facility: CLINIC | Age: 64
End: 2019-04-03

## 2019-04-03 NOTE — TELEPHONE ENCOUNTER
Spoke with patient and notified per Dr castillo that Dr castillo spoke with Dr Kwan and Dr Kwan will decide further treatment after upcoming test scheduled at [facility], patient reports MRI scheduled April 10th

## 2019-04-03 NOTE — TELEPHONE ENCOUNTER
I spoke with Dr. Kwan and he will decide on what other treatment is needed based on her repeat imaging which should be coming up at [facility] soon.

## 2019-06-25 DIAGNOSIS — G62.9 NEUROPATHY: ICD-10-CM

## 2019-06-26 RX ORDER — GABAPENTIN 300 MG/1
CAPSULE ORAL
Qty: 90 CAPSULE | Refills: 5 | Status: SHIPPED | OUTPATIENT
Start: 2019-06-26
